# Patient Record
Sex: MALE | Race: WHITE | NOT HISPANIC OR LATINO | Employment: PART TIME | URBAN - METROPOLITAN AREA
[De-identification: names, ages, dates, MRNs, and addresses within clinical notes are randomized per-mention and may not be internally consistent; named-entity substitution may affect disease eponyms.]

---

## 2017-05-29 ENCOUNTER — HOSPITAL ENCOUNTER (EMERGENCY)
Facility: HOSPITAL | Age: 32
Discharge: HOME | End: 2017-05-30
Attending: EMERGENCY MEDICINE | Admitting: EMERGENCY MEDICINE
Payer: COMMERCIAL

## 2017-05-29 DIAGNOSIS — H66.90 OTITIS MEDIA: Primary | ICD-10-CM

## 2017-05-29 RX ORDER — OXYCODONE HYDROCHLORIDE AND ACETAMINOPHEN 5; 325 MG/1; MG/1
1 TABLET ORAL EVERY 6 HOURS PRN
Qty: 12 TABLET | Refills: 0 | Status: SHIPPED | OUTPATIENT
Start: 2017-05-29 | End: 2017-06-08

## 2017-05-29 RX ORDER — TIZANIDINE 4 MG/1
TABLET ORAL 2 TIMES DAILY
COMMUNITY

## 2017-05-29 RX ORDER — AZITHROMYCIN 250 MG/1
500 TABLET, FILM COATED ORAL EVERY 24 HOURS
Status: DISCONTINUED | OUTPATIENT
Start: 2017-05-30 | End: 2017-05-30 | Stop reason: HOSPADM

## 2017-05-29 RX ORDER — GABAPENTIN 600 MG/1
600 TABLET ORAL
Status: ON HOLD | COMMUNITY
End: 2022-03-06 | Stop reason: ALTCHOICE

## 2017-05-29 RX ORDER — GUAIFENESIN 600 MG
600 TABLET, EXTENDED RELEASE 12 HR ORAL 2 TIMES DAILY
Status: DISCONTINUED | OUTPATIENT
Start: 2017-05-30 | End: 2017-05-30

## 2017-05-29 RX ORDER — FLUTICASONE PROPIONATE 50 MCG
1 SPRAY, SUSPENSION (ML) NASAL DAILY
Status: DISCONTINUED | OUTPATIENT
Start: 2017-05-30 | End: 2017-05-29

## 2017-05-29 RX ORDER — AZITHROMYCIN 250 MG/1
250 TABLET, FILM COATED ORAL DAILY
Qty: 4 TABLET | Refills: 0 | Status: SHIPPED | OUTPATIENT
Start: 2017-05-29 | End: 2017-06-02

## 2017-05-29 RX ORDER — FLUTICASONE PROPIONATE 50 MCG
1 SPRAY, SUSPENSION (ML) NASAL ONCE
Status: COMPLETED | OUTPATIENT
Start: 2017-05-30 | End: 2017-05-30

## 2017-05-30 VITALS
SYSTOLIC BLOOD PRESSURE: 129 MMHG | WEIGHT: 205 LBS | TEMPERATURE: 97.9 F | RESPIRATION RATE: 18 BRPM | DIASTOLIC BLOOD PRESSURE: 71 MMHG | HEART RATE: 87 BPM | OXYGEN SATURATION: 96 %

## 2017-05-30 PROCEDURE — 99283 EMERGENCY DEPT VISIT LOW MDM: CPT

## 2017-05-30 RX ORDER — GUAIFENESIN 600 MG
600 TABLET, EXTENDED RELEASE 12 HR ORAL 2 TIMES DAILY
Status: DISCONTINUED | OUTPATIENT
Start: 2017-05-30 | End: 2017-05-30 | Stop reason: HOSPADM

## 2017-05-30 RX ADMIN — FLUTICASONE PROPIONATE 1 SPRAY: 50 SPRAY, METERED NASAL at 00:13

## 2017-05-30 RX ADMIN — AZITHROMYCIN 500 MG: 250 TABLET, FILM COATED ORAL at 00:13

## 2017-05-30 RX ADMIN — GUAIFENESIN 600 MG: 600 TABLET, EXTENDED RELEASE ORAL at 00:21

## 2017-09-06 ENCOUNTER — HOSPITAL ENCOUNTER (EMERGENCY)
Facility: HOSPITAL | Age: 32
Discharge: HOME | End: 2017-09-06
Attending: EMERGENCY MEDICINE
Payer: COMMERCIAL

## 2017-09-06 VITALS
RESPIRATION RATE: 18 BRPM | OXYGEN SATURATION: 100 % | DIASTOLIC BLOOD PRESSURE: 71 MMHG | SYSTOLIC BLOOD PRESSURE: 147 MMHG | TEMPERATURE: 98.8 F | WEIGHT: 214.95 LBS | HEART RATE: 82 BPM

## 2017-09-06 DIAGNOSIS — J06.9 UPPER RESPIRATORY INFECTION: Primary | ICD-10-CM

## 2017-09-06 PROCEDURE — 99283 EMERGENCY DEPT VISIT LOW MDM: CPT

## 2017-09-06 RX ORDER — IBUPROFEN 600 MG/1
600 TABLET ORAL ONCE
Status: COMPLETED | OUTPATIENT
Start: 2017-09-06 | End: 2017-09-06

## 2017-09-06 RX ORDER — IBUPROFEN 600 MG/1
600 TABLET ORAL EVERY 6 HOURS PRN
Qty: 30 TABLET | Refills: 0 | Status: SHIPPED | OUTPATIENT
Start: 2017-09-06 | End: 2022-03-07 | Stop reason: HOSPADM

## 2017-09-06 RX ORDER — AZITHROMYCIN 250 MG/1
250 TABLET, FILM COATED ORAL DAILY
Qty: 4 TABLET | Refills: 0 | Status: SHIPPED | OUTPATIENT
Start: 2017-09-06 | End: 2017-09-10

## 2017-09-06 RX ORDER — AZITHROMYCIN 250 MG/1
500 TABLET, FILM COATED ORAL ONCE
Status: COMPLETED | OUTPATIENT
Start: 2017-09-06 | End: 2017-09-06

## 2017-09-06 RX ORDER — FLUTICASONE PROPIONATE 50 MCG
1 SPRAY, SUSPENSION (ML) NASAL DAILY
Status: DISCONTINUED | OUTPATIENT
Start: 2017-09-06 | End: 2017-09-06 | Stop reason: HOSPADM

## 2017-09-06 RX ADMIN — AZITHROMYCIN 500 MG: 250 TABLET, FILM COATED ORAL at 01:19

## 2017-09-06 RX ADMIN — FLUTICASONE PROPIONATE 1 SPRAY: 50 SPRAY, METERED NASAL at 01:19

## 2017-09-06 RX ADMIN — IBUPROFEN 600 MG: 600 TABLET ORAL at 01:19

## 2017-11-21 ENCOUNTER — HOSPITAL ENCOUNTER (EMERGENCY)
Facility: HOSPITAL | Age: 32
Discharge: HOME | End: 2017-11-21
Attending: EMERGENCY MEDICINE
Payer: MEDICARE

## 2017-11-21 VITALS
RESPIRATION RATE: 18 BRPM | TEMPERATURE: 98.7 F | WEIGHT: 210 LBS | DIASTOLIC BLOOD PRESSURE: 101 MMHG | SYSTOLIC BLOOD PRESSURE: 149 MMHG | OXYGEN SATURATION: 96 % | HEART RATE: 96 BPM

## 2017-11-21 DIAGNOSIS — J40 BRONCHITIS: ICD-10-CM

## 2017-11-21 DIAGNOSIS — J06.9 UPPER RESPIRATORY INFECTION: Primary | ICD-10-CM

## 2017-11-21 PROCEDURE — 99283 EMERGENCY DEPT VISIT LOW MDM: CPT

## 2017-11-21 RX ORDER — AZITHROMYCIN 250 MG/1
500 TABLET, FILM COATED ORAL ONCE
Status: COMPLETED | OUTPATIENT
Start: 2017-11-21 | End: 2017-11-21

## 2017-11-21 RX ORDER — AZITHROMYCIN 250 MG/1
250 TABLET, FILM COATED ORAL DAILY
Qty: 6 TABLET | Refills: 0 | Status: SHIPPED | OUTPATIENT
Start: 2017-11-21 | End: 2017-11-26

## 2017-11-21 RX ORDER — ACETAMINOPHEN 325 MG/1
975 TABLET ORAL ONCE
Status: COMPLETED | OUTPATIENT
Start: 2017-11-21 | End: 2017-11-21

## 2017-11-21 RX ADMIN — ACETAMINOPHEN 975 MG: 325 TABLET ORAL at 02:16

## 2017-11-21 RX ADMIN — AZITHROMYCIN 500 MG: 250 TABLET, FILM COATED ORAL at 02:17

## 2017-11-21 NOTE — ED NOTES
Pt discharge instructions reviewed, Pt has no further questions at this time  Pt awake and alert, no signs of acute distress noted  Pt ambulated out of ED with a steady gait       Stevenson Badillo RN  11/21/17 8616

## 2017-11-21 NOTE — ED NOTES
Pt seen, assessed, and discharged by Dr Don Mortimer  No  Signs of acute distress noted, YASH Samano, RN  11/21/17 7799

## 2017-11-21 NOTE — ED PROVIDER NOTES
History  Chief Complaint   Patient presents with    URI     Patient c/o URI symptoms and sore throat x 4 days  Patient presents to the emergency department for evaluation of URI symptoms that have been worsening over the past 3 or 4 days  Patient has a nonproductive cough with congestion and shoots up into his head and feels like it is heading down into his chest now  Patient is a smoker but denies fever or chills  Swallowing difficulty  Denies sob  Denies chest pain  Denies rash  Prior to Admission Medications   Prescriptions Last Dose Informant Patient Reported? Taking? TiZANidine HCl (ZANAFLEX PO)   Yes No   Sig: Take by mouth 2 (two) times a day   gabapentin (NEURONTIN) 600 MG tablet   Yes No   Sig: Take 600 mg by mouth daily at bedtime   guaiFENesin (ROBITUSSIN) 100 MG/5ML oral liquid   No No   Sig: Take 5-10 mL by mouth every 4 (four) hours as needed for cough   ibuprofen (MOTRIN) 600 mg tablet   No No   Sig: Take 1 tablet by mouth every 6 (six) hours as needed for mild pain   tapentadol (NUCYNTA) 50 mg tablet   Yes No   Sig: Take 50 mg by mouth as needed (instant relief)   tapentadol (NUCYNTA) tablet   Yes No   Sig: Take 100 mg by mouth 2 (two) times a day      Facility-Administered Medications: None       History reviewed  No pertinent past medical history  Past Surgical History:   Procedure Laterality Date    NECK SURGERY         History reviewed  No pertinent family history  I have reviewed and agree with the history as documented  Social History   Substance Use Topics    Smoking status: Light Tobacco Smoker     Packs/day: 0 50     Types: Cigarettes    Smokeless tobacco: Not on file    Alcohol use Yes      Comment: occasional        Review of Systems   Constitutional: Negative  Negative for activity change, appetite change, chills, diaphoresis, fatigue and fever  HENT: Positive for congestion and ear pain   Negative for dental problem, drooling, ear discharge, rhinorrhea, sinus pain, sneezing and trouble swallowing  Eyes: Negative  Negative for photophobia and visual disturbance  Respiratory: Positive for cough  Negative for choking, chest tightness, shortness of breath, wheezing and stridor  Cardiovascular: Negative  Negative for chest pain, palpitations and leg swelling  Gastrointestinal: Negative  Negative for abdominal distention, abdominal pain, constipation, diarrhea, nausea and rectal pain  Endocrine: Negative  Genitourinary: Negative  Musculoskeletal: Negative  Negative for back pain, neck pain and neck stiffness  Skin: Negative  Negative for rash  Allergic/Immunologic: Negative  Neurological: Negative  Negative for dizziness, seizures, syncope, light-headedness and headaches  Hematological: Negative  Does not bruise/bleed easily  Psychiatric/Behavioral: Negative  Physical Exam  ED Triage Vitals   Temperature Pulse Respirations Blood Pressure SpO2   11/21/17 0150 11/21/17 0149 11/21/17 0149 11/21/17 0149 11/21/17 0149   98 7 °F (37 1 °C) 96 18 (!) 149/101 96 %      Temp Source Heart Rate Source Patient Position - Orthostatic VS BP Location FiO2 (%)   11/21/17 0149 11/21/17 0149 11/21/17 0149 11/21/17 0149 --   Oral Monitor Sitting Left arm       Pain Score       11/21/17 0149       6           Orthostatic Vital Signs  Vitals:    11/21/17 0149   BP: (!) 149/101   Pulse: 96   Patient Position - Orthostatic VS: Sitting       Physical Exam   Constitutional: He is oriented to person, place, and time  He appears well-developed and well-nourished  Nontoxic appearance  No respiratory distress  Patient looks comfortable sitting upright on the stretcher  HENT:   Head: Normocephalic and atraumatic  Right Ear: External ear normal    Left Ear: External ear normal    Mouth/Throat: Oropharynx is clear and moist    No trismus submandibular tenderness  Tonsils are without erythema or exudates  No uvular deviation     Eyes: Conjunctivae and EOM are normal  Pupils are equal, round, and reactive to light  Neck: Normal range of motion  Neck supple  Cardiovascular: Normal rate, regular rhythm, normal heart sounds and intact distal pulses  Pulmonary/Chest: Effort normal and breath sounds normal  No respiratory distress  He has no wheezes  He has no rales  He exhibits no tenderness  Abdominal: Soft  Bowel sounds are normal  He exhibits no distension and no mass  There is no tenderness  There is no rebound and no guarding  No hernia  Musculoskeletal: Normal range of motion  Neurological: He is alert and oriented to person, place, and time  He has normal reflexes  He displays normal reflexes  No cranial nerve deficit or sensory deficit  He exhibits normal muscle tone  Coordination normal    Skin: Skin is warm and dry  No rash noted  Psychiatric: He has a normal mood and affect  His behavior is normal  Judgment and thought content normal    Nursing note and vitals reviewed  ED Medications  Medications   azithromycin (ZITHROMAX) tablet 500 mg (not administered)   acetaminophen (TYLENOL) tablet 975 mg (not administered)       Diagnostic Studies  Results Reviewed     None                 No orders to display              Procedures  Procedures       Phone Contacts  ED Phone Contact    ED Course  ED Course as of Nov 21 0159   Tue Nov 21, 2017   0154 Patient is stable for discharge  Will treat as bronchitis bacterial etiology even though stated to the patient it is possible this is viral etiology  He does smoke  I discussed signs and symptoms that would require return the emergency department                                  MDM  CritCare Time    Disposition  Final diagnoses:   Upper respiratory infection   Bronchitis     Time reflects when diagnosis was documented in both MDM as applicable and the Disposition within this note     Time User Action Codes Description Comment    11/21/2017  1:55 AM Evonne Hines [J06 9] Upper respiratory infection     11/21/2017  1:55 AM Shelby Hines Add [J40] Bronchitis       ED Disposition     ED Disposition Condition Comment    Discharge  Marly Allen discharge to home/self care  Condition at discharge: Stable        Follow-up Information     Follow up With Specialties Details [facility] physician  Schedule an appointment as soon as possible for a visit As needed         Patient's Medications   Discharge Prescriptions    AZITHROMYCIN (ZITHROMAX) 250 MG TABLET    Take 1 tablet by mouth daily for 5 days Take first 2 tablets together, then 1 every day until finished  Start Date: 11/21/2017End Date: 11/26/2017       Order Dose: 250 mg       Quantity: 6 tablet    Refills: 0     No discharge procedures on file      ED Provider  Electronically Signed by           Jimbo Carmichael MD  11/21/17 0155

## 2017-11-21 NOTE — DISCHARGE INSTRUCTIONS
Acute Bronchitis   WHAT YOU NEED TO KNOW:   What is acute bronchitis? Acute bronchitis is swelling and irritation in the air passages of your lungs  This irritation may cause you to cough or have other breathing problems  Acute bronchitis often starts because of another illness, such as a cold or the flu  The illness spreads from your nose and throat to your windpipe and airways  Bronchitis is often called a chest cold  Acute bronchitis lasts about 3 to 6 weeks and is usually not a serious illness  What causes acute bronchitis? · Infection  caused by a virus, bacteria, or a fungus    · Polluted air  caused by chemical fumes, dust, smoke, allergens, or pollution  What increases my risk for acute bronchitis? · Age, usually older adults    · Smoking cigarettes or being around cigarette smoke    · Chronic lung diseases or chronic sinus infections    · Weakened immune system    · Gastroesophageal reflux disease    · Allergies and environmental changes  What are the signs and symptoms of acute bronchitis? · A cough with sputum that may be clear, yellow, or green    · Feeling more tired than usual, and body aches    · A fever and chills    · Wheezing when you breathe    · A tight chest or pain when you breathe or cough  How is acute bronchitis diagnosed? Your healthcare provider may diagnose bronchitis by your symptoms  If he is not sure, you may need the following:  · Blood tests  will be done to see if your symptoms are caused by an infection  · X-ray  pictures of your lungs and heart may show signs of infection, such as pneumonia  Chest x-rays may also show fluid around your heart and lungs  How is acute bronchitis treated? Your healthcare provider will treat any condition that has caused your acute bronchitis  He may also give you any of the following:  · Ibuprofen or acetaminophen  are medicines that help lower your fever  They are available without a doctor's order   Ask your healthcare provider which medicine is right for you  Ask how much to take and how often to take it  Follow directions  These medicines can cause stomach bleeding if not taken correctly  Ibuprofen can cause kidney damage  Do not take ibuprofen if you have kidney disease, an ulcer, or allergies to aspirin  Acetaminophen can cause liver damage  Do not take more than 4,000 milligrams in 24 hours  · Decongestants  help loosen mucus in your lungs and make it easier to cough up  This can help you breathe easier  · Cough suppressants  decrease your urge to cough  If your cough produces mucus, do not take a cough suppressant unless your healthcare provider tells you to  Your healthcare provider may suggest that you take a cough suppressant at night so you can rest     · Inhalers  may be given  Your healthcare provider may give you one or more inhalers to help you breathe easier and cough less  An inhaler gives your medicine to open your airways  Ask your healthcare provider to show you how to use your inhaler correctly  How can I care for myself when I have acute bronchitis? · Get more rest   Rest helps your body to heal  Slowly start to do more each day  Rest when you feel it is needed  · Avoid irritants in the air  Avoid chemicals, fumes, and dust  Wear a face mask if you must work around dust or fumes  Stay inside on days when air pollution levels are high  If you have allergies, stay inside when pollen counts are high  Do not use aerosol products, such as spray-on deodorant, bug spray, and hair spray  · Do not smoke or be around others who smoke  Nicotine and other chemicals in cigarettes and cigars damages the cilia that move mucus out of your lungs  Ask your healthcare provider for information if you currently smoke and need help to quit  E-cigarettes or smokeless tobacco still contain nicotine  Talk to your healthcare provider before you use these products  · Drink liquids as directed    Liquids help keep your air passages moist and help you cough up mucus  You may need to drink more liquids when you have acute bronchitis  Ask how much liquid to drink each day and which liquids are best for you  · Use a humidifier or vaporizer  Use a cool mist humidifier or a vaporizer to increase air moisture in your home  This may make it easier for you to breathe and help decrease your cough  How can I decrease my risk for acute bronchitis? · Get the vaccinations you need  Ask your healthcare provider if you should get vaccinated against the flu or pneumonia  · Prevent the spread of germs  You can decrease your risk of acute bronchitis and other illnesses by doing the following:     [facility] your hands often with soap and water  Carry germ-killing hand lotion or gel with you  You can use the lotion or gel to clean your hands when soap and water are not available  ¨ Do not touch your eyes, nose, or mouth unless you have washed your hands first     ¨ Always cover your mouth when you cough to prevent the spread of germs  It is best to cough into a tissue or your shirt sleeve instead of into your hand  Ask those around you cover their mouths when they cough  ¨ Try to avoid people who have a cold or the flu  If you are sick, stay away from others as much as possible  When should I seek immediate care? · You cough up blood  · Your lips or fingernails turn blue  · You feel like you are not getting enough air when you breathe  When should I contact my healthcare provider? · You have a fever  · Your breathing problems do not go away or get worse  · Your cough does not get better within 4 weeks  · You have questions or concerns about your condition or care  CARE AGREEMENT:   You have the right to help plan your care  Learn about your health condition and how it may be treated  Discuss treatment options with your caregivers to decide what care you want to receive  You always have the right to refuse treatment  The above information is an  only  It is not intended as medical advice for individual conditions or treatments  Talk to your doctor, nurse or pharmacist before following any medical regimen to see if it is safe and effective for you  © 2017 [address] [employer] [employer] Information is for End User's use only and may not be sold, redistributed or otherwise used for commercial purposes  All illustrations and images included in CareNotes® are the copyrighted property of [employer]  or Hany Alvarez  Upper Respiratory Infection   WHAT YOU NEED TO KNOW:   What is an upper respiratory infection? An upper respiratory infection is also called a common cold  It can affect your nose, throat, ears, and sinuses  What causes a cold? The common cold is caused by a virus  There are many different cold viruses, and each is contagious  This means the virus can be easily spread to another person when the sick person coughs or sneezes  The virus can also be spread if you touch something that a person with a cold has touched  You are more likely to get a cold in the winter  Your risk of getting a cold may be increased if you smoke cigarettes or have allergies, such as hay fever  What are the signs and symptoms of a cold? Cold symptoms are usually worst for the first 3 to 5 days  You may have any of the following:  · Runny or stuffy nose    · Sneezing and coughing    · Sore throat or hoarseness    · Red, watery, and sore eyes    · Fatigue     · Chills and fever    · Headache, body aches, or sore muscles  How is a cold treated? There is no cure for the common cold  Colds are caused by viruses and do not get better with antibiotics  Most people get better in 7 to 14 days  You may continue to cough for 2 to 3 weeks  The following may help decrease your symptoms:  · Decongestants  help reduce nasal congestion and help you breathe more easily   If you take decongestant pills, they may make you feel restless or cause problems with your sleep  Do not use decongestant sprays for more than a few days  · Cough suppressants  help reduce coughing  Ask your healthcare provider which type of cough medicine is best for you  · NSAIDs , such as ibuprofen, help decrease swelling, pain, and fever  NSAIDs can cause stomach bleeding or kidney problems in certain people  If you take blood thinner medicine, always ask your healthcare provider if NSAIDs are safe for you  Always read the medicine label and follow directions  · Acetaminophen  decreases pain and fever  It is available without a doctor's order  Ask how much to take and how often to take it  Follow directions  Read the labels of all other medicines you are using to see if they also contain acetaminophen, or ask your doctor or pharmacist  Acetaminophen can cause liver damage if not taken correctly  Do not use more than 4 grams (4,000 milligrams) total of acetaminophen in one day  How can I manage my cold? · Rest as much as possible  Slowly start to do more each day  · Drink more liquids as directed  Liquids will help thin and loosen mucus so you can cough it up  Liquids will also help prevent dehydration  Liquids that help prevent dehydration include water, fruit juice, and broth  Do not drink liquids that contain caffeine  Caffeine can increase your risk for dehydration  Ask your healthcare provider how much liquid to drink each day  · Soothe a sore throat  Gargle with warm salt water  This helps your sore throat feel better  Make salt water by dissolving ¼ teaspoon salt in 1 cup warm water  You may also suck on hard candy or throat lozenges  You may use a sore throat spray  · Use a humidifier or vaporizer  Use a cool mist humidifier or a vaporizer to increase air moisture in your home  This may make it easier for you to breathe and help decrease your cough  · Use saline nasal drops as directed  These help relieve congestion  · Apply petroleum-based jelly around the outside of your nostrils  This can decrease irritation from blowing your nose  · Do not smoke  Nicotine and other chemicals in cigarettes and cigars can make your symptoms worse  They can also cause infections such as bronchitis or pneumonia  Ask your healthcare provider for information if you currently smoke and need help to quit  E-cigarettes or smokeless tobacco still contain nicotine  Talk to your healthcare provider before you use these products  What can I do to prevent the spread of the common cold? · Try to stay away from other people during the first 2 to 3 days of your cold when it is more easily spread  · Do not share food or drinks  · Do not share hand towels with household members  · Wash your hands often, especially after you blow your nose  Turn away from other people and cover your mouth and nose with a tissue when you sneeze or cough  When should I seek immediate care? · You have chest pain or trouble breathing  When should I contact my healthcare provider? · You have a fever over 102ºF (39ºC)  · Your sore throat gets worse or you see white or yellow spots in your throat  · Your symptoms get worse after 3 to 5 days or your cold is not better in 14 days  · You have a rash anywhere on your skin  · You have large, tender lumps in your neck  · You have thick, green, or yellow drainage from your nose  · You cough up thick yellow, green, or bloody mucus  · You are vomiting for more than 24 hours and cannot keep fluids down  · You have a bad earache  · You have questions or concerns about your condition or care  CARE AGREEMENT:   You have the right to help plan your care  Learn about your health condition and how it may be treated  Discuss treatment options with your caregivers to decide what care you want to receive  You always have the right to refuse treatment   The above information is an  only  It is not intended as medical advice for individual conditions or treatments  Talk to your doctor, nurse or pharmacist before following any medical regimen to see if it is safe and effective for you  © 2017 [address] [employer] Information is for End User's use only and may not be sold, redistributed or otherwise used for commercial purposes  All illustrations and images included in CareNotes® are the copyrighted property of [employer]  or Hany Alvarez

## 2018-05-15 ENCOUNTER — APPOINTMENT (EMERGENCY)
Dept: RADIOLOGY | Facility: HOSPITAL | Age: 33
End: 2018-05-15
Payer: MEDICARE

## 2018-05-15 ENCOUNTER — HOSPITAL ENCOUNTER (EMERGENCY)
Facility: HOSPITAL | Age: 33
Discharge: HOME | End: 2018-05-15
Attending: EMERGENCY MEDICINE | Admitting: EMERGENCY MEDICINE
Payer: MEDICARE

## 2018-05-15 VITALS
WEIGHT: 205 LBS | TEMPERATURE: 100.1 F | DIASTOLIC BLOOD PRESSURE: 65 MMHG | HEART RATE: 82 BPM | OXYGEN SATURATION: 99 % | RESPIRATION RATE: 18 BRPM | SYSTOLIC BLOOD PRESSURE: 114 MMHG

## 2018-05-15 DIAGNOSIS — J40 BRONCHITIS: Primary | ICD-10-CM

## 2018-05-15 DIAGNOSIS — R07.9 CHEST PAIN: ICD-10-CM

## 2018-05-15 LAB
ALBUMIN SERPL BCP-MCNC: 3.7 G/DL (ref 3.5–5)
ALP SERPL-CCNC: 112 U/L (ref 46–116)
ALT SERPL W P-5'-P-CCNC: 73 U/L (ref 12–78)
ANION GAP SERPL CALCULATED.3IONS-SCNC: 12 MMOL/L (ref 4–13)
APTT PPP: 33 SECONDS (ref 23–35)
AST SERPL W P-5'-P-CCNC: 36 U/L (ref 5–45)
ATRIAL RATE: 79 BPM
BASOPHILS # BLD AUTO: 0.02 THOUSANDS/ΜL (ref 0–0.1)
BASOPHILS NFR BLD AUTO: 0 % (ref 0–1)
BILIRUB SERPL-MCNC: 0.4 MG/DL (ref 0.2–1)
BUN SERPL-MCNC: 9 MG/DL (ref 5–25)
CALCIUM SERPL-MCNC: 8.5 MG/DL (ref 8.3–10.1)
CHLORIDE SERPL-SCNC: 103 MMOL/L (ref 100–108)
CK SERPL-CCNC: 79 U/L (ref 39–308)
CO2 SERPL-SCNC: 23 MMOL/L (ref 21–32)
CREAT SERPL-MCNC: 0.98 MG/DL (ref 0.6–1.3)
DEPRECATED D DIMER PPP: <270 NG/ML (FEU) (ref 0–424)
EOSINOPHIL # BLD AUTO: 0.1 THOUSAND/ΜL (ref 0–0.61)
EOSINOPHIL NFR BLD AUTO: 1 % (ref 0–6)
ERYTHROCYTE [DISTWIDTH] IN BLOOD BY AUTOMATED COUNT: 12.6 % (ref 11.6–15.1)
GFR SERPL CREATININE-BSD FRML MDRD: 103 ML/MIN/1.73SQ M
GLUCOSE SERPL-MCNC: 104 MG/DL (ref 65–140)
HCT VFR BLD AUTO: 49.3 % (ref 36.5–49.3)
HGB BLD-MCNC: 16.5 G/DL (ref 12–17)
INR PPP: 0.92 (ref 0.86–1.16)
LYMPHOCYTES # BLD AUTO: 1.45 THOUSANDS/ΜL (ref 0.6–4.47)
LYMPHOCYTES NFR BLD AUTO: 14 % (ref 14–44)
MCH RBC QN AUTO: 28.1 PG (ref 26.8–34.3)
MCHC RBC AUTO-ENTMCNC: 33.5 G/DL (ref 31.4–37.4)
MCV RBC AUTO: 84 FL (ref 82–98)
MONOCYTES # BLD AUTO: 0.67 THOUSAND/ΜL (ref 0.17–1.22)
MONOCYTES NFR BLD AUTO: 7 % (ref 4–12)
NEUTROPHILS # BLD AUTO: 7.91 THOUSANDS/ΜL (ref 1.85–7.62)
NEUTS SEG NFR BLD AUTO: 78 % (ref 43–75)
P AXIS: 53 DEGREES
PLATELET # BLD AUTO: 189 THOUSANDS/UL (ref 149–390)
PMV BLD AUTO: 11.2 FL (ref 8.9–12.7)
POTASSIUM SERPL-SCNC: 3.8 MMOL/L (ref 3.5–5.3)
PR INTERVAL: 110 MS
PROT SERPL-MCNC: 7.5 G/DL (ref 6.4–8.2)
PROTHROMBIN TIME: 12.6 SECONDS (ref 12.1–14.4)
QRS AXIS: 47 DEGREES
QRSD INTERVAL: 90 MS
QT INTERVAL: 332 MS
QTC INTERVAL: 380 MS
RBC # BLD AUTO: 5.87 MILLION/UL (ref 3.88–5.62)
SODIUM SERPL-SCNC: 138 MMOL/L (ref 136–145)
T WAVE AXIS: 38 DEGREES
TROPONIN I SERPL-MCNC: <0.02 NG/ML
VENTRICULAR RATE: 79 BPM
WBC # BLD AUTO: 10.15 THOUSAND/UL (ref 4.31–10.16)

## 2018-05-15 PROCEDURE — 99285 EMERGENCY DEPT VISIT HI MDM: CPT

## 2018-05-15 PROCEDURE — 85730 THROMBOPLASTIN TIME PARTIAL: CPT | Performed by: EMERGENCY MEDICINE

## 2018-05-15 PROCEDURE — 82550 ASSAY OF CK (CPK): CPT | Performed by: EMERGENCY MEDICINE

## 2018-05-15 PROCEDURE — 96361 HYDRATE IV INFUSION ADD-ON: CPT

## 2018-05-15 PROCEDURE — 87040 BLOOD CULTURE FOR BACTERIA: CPT | Performed by: EMERGENCY MEDICINE

## 2018-05-15 PROCEDURE — 36415 COLL VENOUS BLD VENIPUNCTURE: CPT | Performed by: EMERGENCY MEDICINE

## 2018-05-15 PROCEDURE — 93005 ELECTROCARDIOGRAM TRACING: CPT

## 2018-05-15 PROCEDURE — 85025 COMPLETE CBC W/AUTO DIFF WBC: CPT | Performed by: EMERGENCY MEDICINE

## 2018-05-15 PROCEDURE — 93010 ELECTROCARDIOGRAM REPORT: CPT | Performed by: INTERNAL MEDICINE

## 2018-05-15 PROCEDURE — 94640 AIRWAY INHALATION TREATMENT: CPT

## 2018-05-15 PROCEDURE — 85379 FIBRIN DEGRADATION QUANT: CPT | Performed by: EMERGENCY MEDICINE

## 2018-05-15 PROCEDURE — 84484 ASSAY OF TROPONIN QUANT: CPT | Performed by: EMERGENCY MEDICINE

## 2018-05-15 PROCEDURE — 80053 COMPREHEN METABOLIC PANEL: CPT | Performed by: EMERGENCY MEDICINE

## 2018-05-15 PROCEDURE — 96374 THER/PROPH/DIAG INJ IV PUSH: CPT

## 2018-05-15 PROCEDURE — 71046 X-RAY EXAM CHEST 2 VIEWS: CPT

## 2018-05-15 PROCEDURE — 85610 PROTHROMBIN TIME: CPT | Performed by: EMERGENCY MEDICINE

## 2018-05-15 RX ORDER — BENZONATATE 200 MG/1
200 CAPSULE ORAL 3 TIMES DAILY PRN
Qty: 21 CAPSULE | Refills: 0 | Status: SHIPPED | OUTPATIENT
Start: 2018-05-15 | End: 2018-05-22

## 2018-05-15 RX ORDER — KETOROLAC TROMETHAMINE 30 MG/ML
30 INJECTION, SOLUTION INTRAMUSCULAR; INTRAVENOUS ONCE
Status: COMPLETED | OUTPATIENT
Start: 2018-05-15 | End: 2018-05-15

## 2018-05-15 RX ORDER — AZITHROMYCIN 250 MG/1
500 TABLET, FILM COATED ORAL ONCE
Status: COMPLETED | OUTPATIENT
Start: 2018-05-15 | End: 2018-05-15

## 2018-05-15 RX ORDER — ALBUTEROL SULFATE 2.5 MG/3ML
2.5 SOLUTION RESPIRATORY (INHALATION) ONCE
Status: COMPLETED | OUTPATIENT
Start: 2018-05-15 | End: 2018-05-15

## 2018-05-15 RX ORDER — ALBUTEROL SULFATE 90 UG/1
2 AEROSOL, METERED RESPIRATORY (INHALATION) ONCE
Status: COMPLETED | OUTPATIENT
Start: 2018-05-15 | End: 2018-05-15

## 2018-05-15 RX ORDER — AZITHROMYCIN 250 MG/1
250 TABLET, FILM COATED ORAL DAILY
Qty: 4 TABLET | Refills: 0 | Status: SHIPPED | OUTPATIENT
Start: 2018-05-16 | End: 2018-05-20

## 2018-05-15 RX ORDER — BENZONATATE 100 MG/1
200 CAPSULE ORAL ONCE
Status: COMPLETED | OUTPATIENT
Start: 2018-05-15 | End: 2018-05-15

## 2018-05-15 RX ADMIN — AZITHROMYCIN 500 MG: 250 TABLET, FILM COATED ORAL at 03:43

## 2018-05-15 RX ADMIN — BENZONATATE 200 MG: 100 CAPSULE ORAL at 03:43

## 2018-05-15 RX ADMIN — ALBUTEROL SULFATE 2.5 MG: 2.5 SOLUTION RESPIRATORY (INHALATION) at 02:35

## 2018-05-15 RX ADMIN — SODIUM CHLORIDE 1000 ML: 0.9 INJECTION, SOLUTION INTRAVENOUS at 02:05

## 2018-05-15 RX ADMIN — ALBUTEROL SULFATE 2 PUFF: 90 AEROSOL, METERED RESPIRATORY (INHALATION) at 03:45

## 2018-05-15 RX ADMIN — KETOROLAC TROMETHAMINE 30 MG: 30 INJECTION, SOLUTION INTRAMUSCULAR at 02:30

## 2018-05-15 NOTE — DISCHARGE INSTRUCTIONS

## 2018-05-15 NOTE — ED NOTES
Pt discharge instructions reviewed, Pt has no further questions at this time  Pt awake and alert, no signs of acute distress noted  Pt ambulated out of ED with a steady gait       Joyce Monday, RN  05/15/18 7381

## 2018-05-20 LAB
BACTERIA BLD CULT: NORMAL
BACTERIA BLD CULT: NORMAL

## 2019-04-15 ENCOUNTER — APPOINTMENT (EMERGENCY)
Dept: RADIOLOGY | Facility: HOSPITAL | Age: 34
End: 2019-04-15
Payer: COMMERCIAL

## 2019-04-15 ENCOUNTER — HOSPITAL ENCOUNTER (EMERGENCY)
Facility: HOSPITAL | Age: 34
Discharge: HOME | End: 2019-04-15
Attending: EMERGENCY MEDICINE | Admitting: EMERGENCY MEDICINE
Payer: COMMERCIAL

## 2019-04-15 VITALS
SYSTOLIC BLOOD PRESSURE: 124 MMHG | WEIGHT: 220 LBS | TEMPERATURE: 99 F | OXYGEN SATURATION: 97 % | HEART RATE: 87 BPM | DIASTOLIC BLOOD PRESSURE: 79 MMHG | RESPIRATION RATE: 20 BRPM

## 2019-04-15 DIAGNOSIS — J18.9 PNEUMONIA: Primary | ICD-10-CM

## 2019-04-15 PROCEDURE — 99283 EMERGENCY DEPT VISIT LOW MDM: CPT

## 2019-04-15 PROCEDURE — 94640 AIRWAY INHALATION TREATMENT: CPT

## 2019-04-15 PROCEDURE — 71046 X-RAY EXAM CHEST 2 VIEWS: CPT

## 2019-04-15 PROCEDURE — 99284 EMERGENCY DEPT VISIT MOD MDM: CPT | Performed by: PHYSICIAN ASSISTANT

## 2019-04-15 RX ORDER — PREDNISONE 20 MG/1
60 TABLET ORAL DAILY
Qty: 12 TABLET | Refills: 0 | Status: SHIPPED | OUTPATIENT
Start: 2019-04-15 | End: 2019-04-19

## 2019-04-15 RX ORDER — ALBUTEROL SULFATE 90 UG/1
2 AEROSOL, METERED RESPIRATORY (INHALATION) ONCE
Status: COMPLETED | OUTPATIENT
Start: 2019-04-15 | End: 2019-04-15

## 2019-04-15 RX ORDER — AZITHROMYCIN 250 MG/1
TABLET, FILM COATED ORAL
Qty: 6 TABLET | Refills: 0 | Status: SHIPPED | OUTPATIENT
Start: 2019-04-15 | End: 2019-04-19

## 2019-04-15 RX ORDER — PREDNISONE 20 MG/1
60 TABLET ORAL ONCE
Status: COMPLETED | OUTPATIENT
Start: 2019-04-15 | End: 2019-04-15

## 2019-04-15 RX ADMIN — ALBUTEROL SULFATE 2 PUFF: 90 AEROSOL, METERED RESPIRATORY (INHALATION) at 18:51

## 2019-04-15 RX ADMIN — PREDNISONE 60 MG: 20 TABLET ORAL at 18:51

## 2019-04-15 RX ADMIN — ALBUTEROL SULFATE 5 MG: 2.5 SOLUTION RESPIRATORY (INHALATION) at 18:52

## 2019-04-15 RX ADMIN — IPRATROPIUM BROMIDE 0.5 MG: 0.5 SOLUTION RESPIRATORY (INHALATION) at 18:52

## 2019-10-17 ENCOUNTER — HOSPITAL ENCOUNTER (EMERGENCY)
Facility: HOSPITAL | Age: 34
Discharge: HOME | End: 2019-10-17
Attending: EMERGENCY MEDICINE

## 2019-10-17 VITALS
RESPIRATION RATE: 18 BRPM | TEMPERATURE: 98.6 F | SYSTOLIC BLOOD PRESSURE: 151 MMHG | WEIGHT: 228.18 LBS | HEART RATE: 88 BPM | OXYGEN SATURATION: 98 % | DIASTOLIC BLOOD PRESSURE: 69 MMHG

## 2019-10-17 DIAGNOSIS — B34.9 VIRAL ILLNESS: Primary | ICD-10-CM

## 2019-10-17 PROCEDURE — 99282 EMERGENCY DEPT VISIT SF MDM: CPT | Performed by: EMERGENCY MEDICINE

## 2019-10-17 PROCEDURE — 99283 EMERGENCY DEPT VISIT LOW MDM: CPT

## 2019-10-17 NOTE — ED PROVIDER NOTES
History  Chief Complaint   Patient presents with    Cold Like Symptoms     cold symptoms started a few days prior, ear pain as well  OTC medications were not working     HPI     21-year-old male with history of asthma presenting for evaluation of dry nonproductive cough, sore throat, bilateral ear pain, and body aches that have been present for the last 2 days  No fevers or chills  Denies shortness of breath  No chest pain  He has been using his home inhalers more than usual   No abdominal pain, nausea, vomiting, or diarrhea  Patient has been taking Mucinex without relief  Prior to Admission Medications   Prescriptions Last Dose Informant Patient Reported? Taking?   gabapentin (NEURONTIN) 600 MG tablet   Yes No   Sig: Take 600 mg by mouth daily at bedtime   guaiFENesin (ROBITUSSIN) 100 MG/5ML oral liquid   No No   Sig: Take 5-10 mL by mouth every 4 (four) hours as needed for cough   ibuprofen (MOTRIN) 600 mg tablet   No No   Sig: Take 1 tablet by mouth every 6 (six) hours as needed for mild pain   tapentadol (NUCYNTA) 50 mg tablet   Yes No   Sig: Take 50 mg by mouth as needed (instant relief)   tapentadol (NUCYNTA) tablet   Yes No   Sig: Take 100 mg by mouth 2 (two) times a day   tiZANidine (ZANAFLEX) 4 mg tablet   Yes No   Sig: Take by mouth 2 (two) times a day      Facility-Administered Medications: None       Past Medical History:   Diagnosis Date    Asthma        Past Surgical History:   Procedure Laterality Date    FACIAL RECONSTRUCTION SURGERY      NECK SURGERY         No family history on file  I have reviewed and agree with the history as documented  Social History     Tobacco Use    Smoking status: Light Tobacco Smoker     Packs/day: 0 50     Types: Cigarettes    Smokeless tobacco: Never Used   Substance Use Topics    Alcohol use: Yes     Comment: occasional    Drug use: No        Review of Systems   Constitutional: Negative for chills and fever     HENT: Positive for ear pain (bilateral) and sore throat  Negative for congestion  Eyes: Negative for visual disturbance  Respiratory: Positive for cough  Negative for shortness of breath  Cardiovascular: Negative for chest pain and leg swelling  Gastrointestinal: Negative for abdominal pain, diarrhea, nausea and vomiting  Genitourinary: Negative for dysuria and frequency  Musculoskeletal: Positive for myalgias (diffuse)  Negative for arthralgias, back pain, neck pain and neck stiffness  Skin: Negative for rash  Neurological: Negative for weakness, numbness and headaches  Psychiatric/Behavioral: Negative for agitation, behavioral problems and confusion  Physical Exam  Physical Exam   Constitutional: He is oriented to person, place, and time  He appears well-developed and well-nourished  No distress  HENT:   Head: Normocephalic and atraumatic  Right Ear: External ear normal    Left Ear: External ear normal    Nose: Nose normal    Mouth/Throat: Oropharynx is clear and moist  No oropharyngeal exudate (tonsils normal in size)  Serous effusions behind both eardrums bilaterally, no erythema  No swelling, erythema, or tenderness to percussion of the bilateral mastoids  Eyes: Pupils are equal, round, and reactive to light  Conjunctivae and EOM are normal    Neck: Normal range of motion  Neck supple  No meningismus   Cardiovascular: Normal rate, regular rhythm, normal heart sounds and intact distal pulses  Exam reveals no gallop and no friction rub  No murmur heard  Pulmonary/Chest: Effort normal and breath sounds normal  No respiratory distress  He has no wheezes  He has no rales  Abdominal: Soft  Bowel sounds are normal  He exhibits no distension  There is no tenderness  There is no guarding  Musculoskeletal: Normal range of motion  He exhibits no edema or deformity  Neurological: He is alert and oriented to person, place, and time  He exhibits normal muscle tone  Skin: Skin is warm and dry  He is not diaphoretic  Vital Signs  ED Triage Vitals [10/17/19 1411]   Temperature Pulse Respirations Blood Pressure SpO2   98 6 °F (37 °C) 88 18 151/69 98 %      Temp Source Heart Rate Source Patient Position - Orthostatic VS BP Location FiO2 (%)   Oral Monitor Lying Right arm --      Pain Score       7           Vitals:    10/17/19 1411   BP: 151/69   Pulse: 88   Patient Position - Orthostatic VS: Lying         Visual Acuity      ED Medications  Medications - No data to display    Diagnostic Studies  Results Reviewed     None                 No orders to display              Procedures  Procedures       ED Course                               MDM  Number of Diagnoses or Management Options  Viral illness: new and does not require workup  Diagnosis management comments:   Nontoxic  Afebrile and hemodynamically stable  Eating and drinking normally  No evidence of acute bacterial illness such as acute otitis media, strep pharyngitis, pneumonia, or intra-abdominal process on exam   Neck supple, doubt meningitis  Constellation of symptoms consistent with viral illness  Recommend Mucinex, Flonase, and Sudafed as needed for relief, staying well hydrated  Work note provided as patient works in [employer]  Return precautions discussed for shortness of breath, vomiting with inability to tolerate oral intake, or new or concerning symptoms  Patient discharged in good condition  Patient Progress  Patient progress: stable         Disposition  Final diagnoses:   Viral illness     Time reflects when diagnosis was documented in both MDM as applicable and the Disposition within this note     Time User Action Codes Description Comment    10/17/2019  2:39 PM Jamia Velez Add [B34 9] Viral illness       ED Disposition     ED Disposition Condition Date/Time Comment    Discharge Stable u Oct 17, 2019  2:38 PM Diana Must discharge to home/self care              Follow-up Information     Follow up With Specialties Details Why Contact Info Additional Information    Theresa Rodriguez MD Internal Medicine In 3 days If symptoms persist [address] [address]  [phone]       [address] Emergency Department Emergency Medicine  As we discussed, return to the Emergency Department for difficulty breathing, vomiting with inability to tolerate oral intake, or new or concerning symptoms  [address] [facility] [address] [phone] AN ED, [address], [place], [place], [address]          Discharge Medication List as of 10/17/2019  2:40 PM      CONTINUE these medications which have NOT CHANGED    Details   gabapentin (NEURONTIN) 600 MG tablet Take 600 mg by mouth daily at bedtime, Until Discontinued, Historical Med      guaiFENesin (ROBITUSSIN) 100 MG/5ML oral liquid Take 5-10 mL by mouth every 4 (four) hours as needed for cough, Starting 5/29/2017, Until Discontinued, Print      ibuprofen (MOTRIN) 600 mg tablet Take 1 tablet by mouth every 6 (six) hours as needed for mild pain, Starting Wed 9/6/2017, Print      !! tapentadol (NUCYNTA) 50 mg tablet Take 50 mg by mouth as needed (instant relief), Until Discontinued, Historical Med      !! tapentadol (NUCYNTA) tablet Take 100 mg by mouth 2 (two) times a day, Until Discontinued, Historical Med      tiZANidine (ZANAFLEX) 4 mg tablet Take by mouth 2 (two) times a day, Historical Med       !! - Potential duplicate medications found  Please discuss with provider  No discharge procedures on file      ED Provider  Electronically Signed by           Eli Rodriguez MD  10/17/19 6399

## 2020-01-04 ENCOUNTER — HOSPITAL ENCOUNTER (EMERGENCY)
Facility: HOSPITAL | Age: 35
Discharge: HOME | End: 2020-01-04
Attending: EMERGENCY MEDICINE | Admitting: EMERGENCY MEDICINE

## 2020-01-04 VITALS
TEMPERATURE: 100.9 F | RESPIRATION RATE: 18 BRPM | HEART RATE: 96 BPM | DIASTOLIC BLOOD PRESSURE: 79 MMHG | WEIGHT: 236.99 LBS | OXYGEN SATURATION: 99 % | SYSTOLIC BLOOD PRESSURE: 143 MMHG

## 2020-01-04 DIAGNOSIS — J11.1 INFLUENZA: Primary | ICD-10-CM

## 2020-01-04 LAB
FLUAV RNA NPH QL NAA+PROBE: ABNORMAL
FLUBV RNA NPH QL NAA+PROBE: DETECTED
RSV RNA NPH QL NAA+PROBE: ABNORMAL

## 2020-01-04 PROCEDURE — 87631 RESP VIRUS 3-5 TARGETS: CPT | Performed by: EMERGENCY MEDICINE

## 2020-01-04 PROCEDURE — 99284 EMERGENCY DEPT VISIT MOD MDM: CPT | Performed by: EMERGENCY MEDICINE

## 2020-01-04 PROCEDURE — 99283 EMERGENCY DEPT VISIT LOW MDM: CPT

## 2020-01-04 RX ORDER — ACETAMINOPHEN 325 MG/1
650 TABLET ORAL ONCE
Status: COMPLETED | OUTPATIENT
Start: 2020-01-04 | End: 2020-01-04

## 2020-01-04 RX ORDER — IBUPROFEN 400 MG/1
400 TABLET ORAL ONCE
Status: COMPLETED | OUTPATIENT
Start: 2020-01-04 | End: 2020-01-04

## 2020-01-04 RX ORDER — OSELTAMIVIR PHOSPHATE 75 MG/1
75 CAPSULE ORAL ONCE
Status: COMPLETED | OUTPATIENT
Start: 2020-01-04 | End: 2020-01-04

## 2020-01-04 RX ORDER — OSELTAMIVIR PHOSPHATE 75 MG/1
75 CAPSULE ORAL 2 TIMES DAILY
Qty: 10 CAPSULE | Refills: 0 | Status: SHIPPED | OUTPATIENT
Start: 2020-01-04 | End: 2020-01-09

## 2020-01-04 RX ADMIN — IBUPROFEN 400 MG: 400 TABLET ORAL at 09:56

## 2020-01-04 RX ADMIN — OSELTAMIVIR PHOSPHATE 75 MG: 75 CAPSULE ORAL at 10:56

## 2020-01-04 RX ADMIN — ACETAMINOPHEN 650 MG: 325 TABLET, FILM COATED ORAL at 09:56

## 2020-01-04 NOTE — ED PROVIDER NOTES
History  Chief Complaint   Patient presents with    Sore Throat     Patient reports having headache, sore throat, and cough for approx 1 day  Concerned he may be getting bronchitis  History provided by:  Patient  Sore Throat   Location:  Generalized  Quality:  Aching  Severity:  Moderate  Onset quality:  Gradual  Duration:  1 day  Timing:  Constant  Progression:  Worsening  Chronicity:  New  Relieved by:  None tried  Worsened by:  Nothing  Ineffective treatments:  None tried  Associated symptoms: cough and rhinorrhea    Associated symptoms: no abdominal pain, no adenopathy, no chest pain, no chills, no epistaxis, no fever, no headaches, no neck stiffness, no night sweats, no rash, no shortness of breath and no stridor        Prior to Admission Medications   Prescriptions Last Dose Informant Patient Reported? Taking?   gabapentin (NEURONTIN) 600 MG tablet   Yes No   Sig: Take 600 mg by mouth daily at bedtime   guaiFENesin (ROBITUSSIN) 100 MG/5ML oral liquid Not Taking at Unknown time  No No   Sig: Take 5-10 mL by mouth every 4 (four) hours as needed for cough   Patient not taking: Reported on 1/4/2020   ibuprofen (MOTRIN) 600 mg tablet   No No   Sig: Take 1 tablet by mouth every 6 (six) hours as needed for mild pain   tapentadol (NUCYNTA) 50 mg tablet   Yes No   Sig: Take 50 mg by mouth as needed (instant relief)   tapentadol (NUCYNTA) tablet   Yes No   Sig: Take 100 mg by mouth 2 (two) times a day   tiZANidine (ZANAFLEX) 4 mg tablet   Yes No   Sig: Take by mouth 2 (two) times a day      Facility-Administered Medications: None       Past Medical History:   Diagnosis Date    Asthma        Past Surgical History:   Procedure Laterality Date    FACIAL RECONSTRUCTION SURGERY      NECK SURGERY         History reviewed  No pertinent family history  I have reviewed and agree with the history as documented      Social History     Tobacco Use    Smoking status: Light Tobacco Smoker     Packs/day: 0 50     Types: Cigarettes    Smokeless tobacco: Never Used   Substance Use Topics    Alcohol use: Yes     Comment: occasional    Drug use: No        Review of Systems   Constitutional: Negative for activity change, chills, diaphoresis, fever and night sweats  HENT: Positive for congestion, rhinorrhea and sore throat  Negative for nosebleeds and sinus pressure  Eyes: Negative for pain and visual disturbance  Respiratory: Positive for cough  Negative for chest tightness, shortness of breath, wheezing and stridor  Cardiovascular: Negative for chest pain and palpitations  Gastrointestinal: Negative for abdominal distention, abdominal pain, constipation, diarrhea, nausea and vomiting  Genitourinary: Negative for dysuria and frequency  Musculoskeletal: Positive for myalgias  Negative for neck pain and neck stiffness  Skin: Negative for rash  Neurological: Negative for dizziness, speech difficulty, light-headedness, numbness and headaches  Hematological: Negative for adenopathy  Physical Exam  Physical Exam   Constitutional: He is oriented to person, place, and time  He appears well-developed  No distress  HENT:   Head: Normocephalic and atraumatic  Right Ear: Tympanic membrane normal    Left Ear: Tympanic membrane normal    Mouth/Throat: Uvula is midline  No oral lesions  Posterior oropharyngeal erythema present  No posterior oropharyngeal edema or tonsillar abscesses  Eyes: Pupils are equal, round, and reactive to light  Neck: Normal range of motion  Neck supple  No tracheal deviation present  Cardiovascular: Normal rate, regular rhythm, normal heart sounds and intact distal pulses  No murmur heard  Pulmonary/Chest: Effort normal and breath sounds normal  No stridor  No respiratory distress  Abdominal: Soft  He exhibits no distension  There is no tenderness  There is no rebound and no guarding  Musculoskeletal: Normal range of motion     Neurological: He is alert and oriented to person, place, and time  Skin: Skin is warm and dry  He is not diaphoretic  No erythema  No pallor  Psychiatric: He has a normal mood and affect  Vitals reviewed  Vital Signs  ED Triage Vitals [01/04/20 0937]   Temperature Pulse Respirations Blood Pressure SpO2   (!) 100 9 °F (38 3 °C) 96 18 143/79 99 %      Temp Source Heart Rate Source Patient Position - Orthostatic VS BP Location FiO2 (%)   Oral Monitor -- Right arm --      Pain Score       2           Vitals:    01/04/20 0937   BP: 143/79   Pulse: 96         Visual Acuity      ED Medications  Medications   acetaminophen (TYLENOL) tablet 650 mg (650 mg Oral Given 1/4/20 0956)   ibuprofen (MOTRIN) tablet 400 mg (400 mg Oral Given 1/4/20 0956)   oseltamivir (TAMIFLU) capsule 75 mg (75 mg Oral Given 1/4/20 1056)       Diagnostic Studies  Results Reviewed     Procedure Component Value Units Date/Time    Influenza A/B and RSV PCR [[id number]]  (Abnormal) Collected:  01/04/20 0951    Lab Status:  Final result Specimen:  Nose Updated:  01/04/20 1032     INFLUENZA A PCR None Detected     INFLUENZA B PCR Detected     RSV PCR None Detected                 No orders to display              Procedures  Procedures         ED Course                               MDM  Number of Diagnoses or Management Options  Influenza: new and requires workup  Diagnosis management comments:       Initial ED assessment:  65-year-old male, worse in , fever, body aches, sore throat    Initial DDx includes but is not limited to:   High suspicion for clinical influenza  Initial ED plan:   Patient requesting confirmatory testing  , would typically just treat with Tamiflu , will test for influenza        Final ED summary/disposition:   After evaluation and workup in the emergency department, positiv for influenza will treat with Tamiflu       Amount and/or Complexity of Data Reviewed  Clinical lab tests: ordered and reviewed  Review and summarize past medical records: yes          Disposition  Final diagnoses:   Influenza     Time reflects when diagnosis was documented in both MDM as applicable and the Disposition within this note     Time User Action Codes Description Comment    1/4/2020 10:50 AM Ronda Delaney Add [J11 1] Influenza       ED Disposition     ED Disposition Condition Date/Time Comment    Discharge Stable Sat Jan 4, 2020 10:50 AM Álvaro Bridges discharge to home/self care  Follow-up Information    None         Discharge Medication List as of 1/4/2020 10:51 AM      START taking these medications    Details   oseltamivir (TAMIFLU) 75 mg capsule Take 1 capsule (75 mg total) by mouth 2 (two) times a day for 5 days, Starting Sat 1/4/2020, Until Thu 1/9/2020, Print         CONTINUE these medications which have NOT CHANGED    Details   gabapentin (NEURONTIN) 600 MG tablet Take 600 mg by mouth daily at bedtime, Until Discontinued, Historical Med      guaiFENesin (ROBITUSSIN) 100 MG/5ML oral liquid Take 5-10 mL by mouth every 4 (four) hours as needed for cough, Starting 5/29/2017, Until Discontinued, Print      ibuprofen (MOTRIN) 600 mg tablet Take 1 tablet by mouth every 6 (six) hours as needed for mild pain, Starting Wed 9/6/2017, Print      !! tapentadol (NUCYNTA) 50 mg tablet Take 50 mg by mouth as needed (instant relief), Until Discontinued, Historical Med      !! tapentadol (NUCYNTA) tablet Take 100 mg by mouth 2 (two) times a day, Until Discontinued, Historical Med      tiZANidine (ZANAFLEX) 4 mg tablet Take by mouth 2 (two) times a day, Historical Med       !! - Potential duplicate medications found  Please discuss with provider  No discharge procedures on file      ED Provider  Electronically Signed by           Derrick Eden DO  01/04/20 0965

## 2020-11-23 ENCOUNTER — HOSPITAL ENCOUNTER (EMERGENCY)
Facility: HOSPITAL | Age: 35
Discharge: HOME | End: 2020-11-23
Attending: EMERGENCY MEDICINE

## 2020-11-23 VITALS
SYSTOLIC BLOOD PRESSURE: 136 MMHG | WEIGHT: 215 LBS | TEMPERATURE: 98.3 F | HEART RATE: 107 BPM | OXYGEN SATURATION: 96 % | DIASTOLIC BLOOD PRESSURE: 80 MMHG | RESPIRATION RATE: 16 BRPM | HEIGHT: 67 IN | BODY MASS INDEX: 33.74 KG/M2

## 2020-11-23 DIAGNOSIS — K04.7 DENTAL ABSCESS: Primary | ICD-10-CM

## 2020-11-23 DIAGNOSIS — K08.89 TOOTHACHE: ICD-10-CM

## 2020-11-23 PROCEDURE — 99282 EMERGENCY DEPT VISIT SF MDM: CPT

## 2020-11-23 PROCEDURE — 99284 EMERGENCY DEPT VISIT MOD MDM: CPT | Performed by: EMERGENCY MEDICINE

## 2020-11-23 RX ORDER — CLINDAMYCIN HYDROCHLORIDE 150 MG/1
300 CAPSULE ORAL 4 TIMES DAILY
Qty: 56 CAPSULE | Refills: 0 | Status: SHIPPED | OUTPATIENT
Start: 2020-11-23 | End: 2020-11-30

## 2020-11-23 RX ORDER — CLINDAMYCIN HYDROCHLORIDE 150 MG/1
300 CAPSULE ORAL ONCE
Status: COMPLETED | OUTPATIENT
Start: 2020-11-23 | End: 2020-11-23

## 2020-11-23 RX ADMIN — CLINDAMYCIN HYDROCHLORIDE 300 MG: 150 CAPSULE ORAL at 22:47

## 2020-11-26 ENCOUNTER — APPOINTMENT (EMERGENCY)
Dept: CT IMAGING | Facility: HOSPITAL | Age: 35
End: 2020-11-26

## 2020-11-26 ENCOUNTER — HOSPITAL ENCOUNTER (EMERGENCY)
Facility: HOSPITAL | Age: 35
Discharge: HOME | End: 2020-11-26
Attending: EMERGENCY MEDICINE | Admitting: EMERGENCY MEDICINE

## 2020-11-26 VITALS
HEART RATE: 76 BPM | SYSTOLIC BLOOD PRESSURE: 132 MMHG | TEMPERATURE: 97.9 F | DIASTOLIC BLOOD PRESSURE: 78 MMHG | OXYGEN SATURATION: 98 % | RESPIRATION RATE: 18 BRPM

## 2020-11-26 DIAGNOSIS — K04.7 DENTAL ABSCESS: Primary | ICD-10-CM

## 2020-11-26 LAB
ANION GAP SERPL CALCULATED.3IONS-SCNC: 11 MMOL/L (ref 4–13)
BASOPHILS # BLD AUTO: 0.05 THOUSANDS/ΜL (ref 0–0.1)
BASOPHILS NFR BLD AUTO: 1 % (ref 0–1)
BUN SERPL-MCNC: 6 MG/DL (ref 5–25)
CALCIUM SERPL-MCNC: 8.7 MG/DL (ref 8.3–10.1)
CHLORIDE SERPL-SCNC: 104 MMOL/L (ref 100–108)
CO2 SERPL-SCNC: 24 MMOL/L (ref 21–32)
CREAT SERPL-MCNC: 0.98 MG/DL (ref 0.6–1.3)
EOSINOPHIL # BLD AUTO: 0.2 THOUSAND/ΜL (ref 0–0.61)
EOSINOPHIL NFR BLD AUTO: 2 % (ref 0–6)
ERYTHROCYTE [DISTWIDTH] IN BLOOD BY AUTOMATED COUNT: 12.5 % (ref 11.6–15.1)
GFR SERPL CREATININE-BSD FRML MDRD: 101 ML/MIN/1.73SQ M
GLUCOSE SERPL-MCNC: 117 MG/DL (ref 65–140)
HCT VFR BLD AUTO: 49 % (ref 36.5–49.3)
HGB BLD-MCNC: 15.7 G/DL (ref 12–17)
IMM GRANULOCYTES # BLD AUTO: 0.04 THOUSAND/UL (ref 0–0.2)
IMM GRANULOCYTES NFR BLD AUTO: 0 % (ref 0–2)
LACTATE SERPL-SCNC: 0.7 MMOL/L (ref 0.5–2)
LYMPHOCYTES # BLD AUTO: 2.09 THOUSANDS/ΜL (ref 0.6–4.47)
LYMPHOCYTES NFR BLD AUTO: 20 % (ref 14–44)
MCH RBC QN AUTO: 28.3 PG (ref 26.8–34.3)
MCHC RBC AUTO-ENTMCNC: 32 G/DL (ref 31.4–37.4)
MCV RBC AUTO: 88 FL (ref 82–98)
MONOCYTES # BLD AUTO: 0.75 THOUSAND/ΜL (ref 0.17–1.22)
MONOCYTES NFR BLD AUTO: 7 % (ref 4–12)
NEUTROPHILS # BLD AUTO: 7.39 THOUSANDS/ΜL (ref 1.85–7.62)
NEUTS SEG NFR BLD AUTO: 70 % (ref 43–75)
NRBC BLD AUTO-RTO: 0 /100 WBCS
PLATELET # BLD AUTO: 273 THOUSANDS/UL (ref 149–390)
PMV BLD AUTO: 10.7 FL (ref 8.9–12.7)
POTASSIUM SERPL-SCNC: 3.9 MMOL/L (ref 3.5–5.3)
RBC # BLD AUTO: 5.55 MILLION/UL (ref 3.88–5.62)
SODIUM SERPL-SCNC: 139 MMOL/L (ref 136–145)
WBC # BLD AUTO: 10.52 THOUSAND/UL (ref 4.31–10.16)

## 2020-11-26 PROCEDURE — 36415 COLL VENOUS BLD VENIPUNCTURE: CPT | Performed by: EMERGENCY MEDICINE

## 2020-11-26 PROCEDURE — 96375 TX/PRO/DX INJ NEW DRUG ADDON: CPT

## 2020-11-26 PROCEDURE — 70491 CT SOFT TISSUE NECK W/DYE: CPT

## 2020-11-26 PROCEDURE — 96365 THER/PROPH/DIAG IV INF INIT: CPT

## 2020-11-26 PROCEDURE — 96376 TX/PRO/DX INJ SAME DRUG ADON: CPT

## 2020-11-26 PROCEDURE — 99284 EMERGENCY DEPT VISIT MOD MDM: CPT | Performed by: EMERGENCY MEDICINE

## 2020-11-26 PROCEDURE — 96361 HYDRATE IV INFUSION ADD-ON: CPT

## 2020-11-26 PROCEDURE — 85025 COMPLETE CBC W/AUTO DIFF WBC: CPT | Performed by: EMERGENCY MEDICINE

## 2020-11-26 PROCEDURE — 80048 BASIC METABOLIC PNL TOTAL CA: CPT | Performed by: EMERGENCY MEDICINE

## 2020-11-26 PROCEDURE — G1004 CDSM NDSC: HCPCS

## 2020-11-26 PROCEDURE — 99284 EMERGENCY DEPT VISIT MOD MDM: CPT

## 2020-11-26 PROCEDURE — 83605 ASSAY OF LACTIC ACID: CPT | Performed by: EMERGENCY MEDICINE

## 2020-11-26 RX ORDER — AMOXICILLIN 500 MG/1
500 CAPSULE ORAL EVERY 12 HOURS SCHEDULED
Qty: 14 CAPSULE | Refills: 0 | Status: SHIPPED | OUTPATIENT
Start: 2020-11-26 | End: 2020-11-26 | Stop reason: SDUPTHER

## 2020-11-26 RX ORDER — HYDROCODONE BITARTRATE AND ACETAMINOPHEN 5; 325 MG/1; MG/1
1 TABLET ORAL EVERY 6 HOURS PRN
Qty: 12 TABLET | Refills: 0 | Status: SHIPPED | OUTPATIENT
Start: 2020-11-26 | End: 2020-11-26 | Stop reason: SDUPTHER

## 2020-11-26 RX ORDER — KETOROLAC TROMETHAMINE 30 MG/ML
15 INJECTION, SOLUTION INTRAMUSCULAR; INTRAVENOUS ONCE
Status: COMPLETED | OUTPATIENT
Start: 2020-11-26 | End: 2020-11-26

## 2020-11-26 RX ORDER — AMOXICILLIN 500 MG/1
500 CAPSULE ORAL EVERY 12 HOURS SCHEDULED
Qty: 14 CAPSULE | Refills: 0 | Status: SHIPPED | OUTPATIENT
Start: 2020-11-26 | End: 2020-12-03

## 2020-11-26 RX ORDER — HYDROCODONE BITARTRATE AND ACETAMINOPHEN 5; 325 MG/1; MG/1
1 TABLET ORAL EVERY 6 HOURS PRN
Qty: 12 TABLET | Refills: 0 | Status: SHIPPED | OUTPATIENT
Start: 2020-11-26 | End: 2020-12-06

## 2020-11-26 RX ADMIN — IOHEXOL 100 ML: 350 INJECTION, SOLUTION INTRAVENOUS at 10:01

## 2020-11-26 RX ADMIN — AMPICILLIN SODIUM AND SULBACTAM SODIUM 3 G: 2; 1 INJECTION, POWDER, FOR SOLUTION INTRAMUSCULAR; INTRAVENOUS at 10:07

## 2020-11-26 RX ADMIN — KETOROLAC TROMETHAMINE 15 MG: 30 INJECTION, SOLUTION INTRAMUSCULAR at 11:24

## 2020-11-26 RX ADMIN — KETOROLAC TROMETHAMINE 15 MG: 30 INJECTION, SOLUTION INTRAMUSCULAR at 07:53

## 2020-11-26 RX ADMIN — SODIUM CHLORIDE 1000 ML: 0.9 INJECTION, SOLUTION INTRAVENOUS at 07:52

## 2020-11-30 ENCOUNTER — TELEPHONE (OUTPATIENT)
Dept: GASTROENTEROLOGY | Facility: AMBULARY SURGERY CENTER | Age: 35
End: 2020-11-30

## 2020-11-30 ENCOUNTER — HOSPITAL ENCOUNTER (EMERGENCY)
Facility: HOSPITAL | Age: 35
Discharge: HOME | End: 2020-11-30
Attending: EMERGENCY MEDICINE | Admitting: EMERGENCY MEDICINE

## 2020-11-30 ENCOUNTER — APPOINTMENT (EMERGENCY)
Dept: CT IMAGING | Facility: HOSPITAL | Age: 35
End: 2020-11-30

## 2020-11-30 VITALS
RESPIRATION RATE: 18 BRPM | TEMPERATURE: 97.5 F | DIASTOLIC BLOOD PRESSURE: 60 MMHG | OXYGEN SATURATION: 99 % | SYSTOLIC BLOOD PRESSURE: 105 MMHG | WEIGHT: 220 LBS | HEART RATE: 60 BPM | BODY MASS INDEX: 34.46 KG/M2

## 2020-11-30 DIAGNOSIS — K92.0 HEMATEMESIS: Primary | ICD-10-CM

## 2020-11-30 LAB
ALBUMIN SERPL BCP-MCNC: 3 G/DL (ref 3.5–5)
ALP SERPL-CCNC: 71 U/L (ref 46–116)
ALT SERPL W P-5'-P-CCNC: 48 U/L (ref 12–78)
ANION GAP SERPL CALCULATED.3IONS-SCNC: 8 MMOL/L (ref 4–13)
APTT PPP: 25 SECONDS (ref 23–37)
AST SERPL W P-5'-P-CCNC: 27 U/L (ref 5–45)
BASOPHILS # BLD AUTO: 0.04 THOUSANDS/ΜL (ref 0–0.1)
BASOPHILS NFR BLD AUTO: 0 % (ref 0–1)
BILIRUB SERPL-MCNC: 0.27 MG/DL (ref 0.2–1)
BUN SERPL-MCNC: 28 MG/DL (ref 5–25)
CALCIUM ALBUM COR SERPL-MCNC: 9 MG/DL (ref 8.3–10.1)
CALCIUM SERPL-MCNC: 8.2 MG/DL (ref 8.3–10.1)
CHLORIDE SERPL-SCNC: 106 MMOL/L (ref 100–108)
CO2 SERPL-SCNC: 25 MMOL/L (ref 21–32)
CREAT SERPL-MCNC: 1.03 MG/DL (ref 0.6–1.3)
EOSINOPHIL # BLD AUTO: 0.14 THOUSAND/ΜL (ref 0–0.61)
EOSINOPHIL NFR BLD AUTO: 1 % (ref 0–6)
ERYTHROCYTE [DISTWIDTH] IN BLOOD BY AUTOMATED COUNT: 12.3 % (ref 11.6–15.1)
EXT FECAL OCCULT BLOOD SCREEN: POSITIVE
EXT. CONTROL ED NAV: ABNORMAL
GFR SERPL CREATININE-BSD FRML MDRD: 95 ML/MIN/1.73SQ M
GLUCOSE SERPL-MCNC: 103 MG/DL (ref 65–140)
HCT VFR BLD AUTO: 34.8 % (ref 36.5–49.3)
HGB BLD-MCNC: 11.2 G/DL (ref 12–17)
IMM GRANULOCYTES # BLD AUTO: 0.02 THOUSAND/UL (ref 0–0.2)
IMM GRANULOCYTES NFR BLD AUTO: 0 % (ref 0–2)
INR PPP: 1.15 (ref 0.84–1.19)
LIPASE SERPL-CCNC: 79 U/L (ref 73–393)
LYMPHOCYTES # BLD AUTO: 2.15 THOUSANDS/ΜL (ref 0.6–4.47)
LYMPHOCYTES NFR BLD AUTO: 20 % (ref 14–44)
MCH RBC QN AUTO: 28.6 PG (ref 26.8–34.3)
MCHC RBC AUTO-ENTMCNC: 32.2 G/DL (ref 31.4–37.4)
MCV RBC AUTO: 89 FL (ref 82–98)
MONOCYTES # BLD AUTO: 0.5 THOUSAND/ΜL (ref 0.17–1.22)
MONOCYTES NFR BLD AUTO: 5 % (ref 4–12)
NEUTROPHILS # BLD AUTO: 7.68 THOUSANDS/ΜL (ref 1.85–7.62)
NEUTS SEG NFR BLD AUTO: 74 % (ref 43–75)
NRBC BLD AUTO-RTO: 0 /100 WBCS
PLATELET # BLD AUTO: 260 THOUSANDS/UL (ref 149–390)
PMV BLD AUTO: 10.8 FL (ref 8.9–12.7)
POTASSIUM SERPL-SCNC: 4.2 MMOL/L (ref 3.5–5.3)
PROT SERPL-MCNC: 6.1 G/DL (ref 6.4–8.2)
PROTHROMBIN TIME: 14.8 SECONDS (ref 11.6–14.5)
RBC # BLD AUTO: 3.92 MILLION/UL (ref 3.88–5.62)
SODIUM SERPL-SCNC: 139 MMOL/L (ref 136–145)
WBC # BLD AUTO: 10.53 THOUSAND/UL (ref 4.31–10.16)

## 2020-11-30 PROCEDURE — 82270 OCCULT BLOOD FECES: CPT | Performed by: PHYSICIAN ASSISTANT

## 2020-11-30 PROCEDURE — C9113 INJ PANTOPRAZOLE SODIUM, VIA: HCPCS | Performed by: PHYSICIAN ASSISTANT

## 2020-11-30 PROCEDURE — 74177 CT ABD & PELVIS W/CONTRAST: CPT

## 2020-11-30 PROCEDURE — 85025 COMPLETE CBC W/AUTO DIFF WBC: CPT | Performed by: PHYSICIAN ASSISTANT

## 2020-11-30 PROCEDURE — 96375 TX/PRO/DX INJ NEW DRUG ADDON: CPT

## 2020-11-30 PROCEDURE — 83690 ASSAY OF LIPASE: CPT | Performed by: PHYSICIAN ASSISTANT

## 2020-11-30 PROCEDURE — 99284 EMERGENCY DEPT VISIT MOD MDM: CPT | Performed by: PHYSICIAN ASSISTANT

## 2020-11-30 PROCEDURE — 36415 COLL VENOUS BLD VENIPUNCTURE: CPT | Performed by: PHYSICIAN ASSISTANT

## 2020-11-30 PROCEDURE — 96365 THER/PROPH/DIAG IV INF INIT: CPT

## 2020-11-30 PROCEDURE — 85610 PROTHROMBIN TIME: CPT | Performed by: PHYSICIAN ASSISTANT

## 2020-11-30 PROCEDURE — 85730 THROMBOPLASTIN TIME PARTIAL: CPT | Performed by: PHYSICIAN ASSISTANT

## 2020-11-30 PROCEDURE — 80053 COMPREHEN METABOLIC PANEL: CPT | Performed by: PHYSICIAN ASSISTANT

## 2020-11-30 PROCEDURE — 99285 EMERGENCY DEPT VISIT HI MDM: CPT

## 2020-11-30 PROCEDURE — 96366 THER/PROPH/DIAG IV INF ADDON: CPT

## 2020-11-30 RX ORDER — SUCRALFATE 1 G/1
1 TABLET ORAL 3 TIMES DAILY
Qty: 30 TABLET | Refills: 0 | Status: ON HOLD | OUTPATIENT
Start: 2020-11-30 | End: 2022-03-07

## 2020-11-30 RX ORDER — ONDANSETRON 2 MG/ML
4 INJECTION INTRAMUSCULAR; INTRAVENOUS ONCE
Status: COMPLETED | OUTPATIENT
Start: 2020-11-30 | End: 2020-11-30

## 2020-11-30 RX ORDER — PANTOPRAZOLE SODIUM 20 MG/1
40 TABLET, DELAYED RELEASE ORAL DAILY
Qty: 20 TABLET | Refills: 0 | Status: SHIPPED | OUTPATIENT
Start: 2020-11-30 | End: 2020-12-01 | Stop reason: SDUPTHER

## 2020-11-30 RX ORDER — SUCRALFATE 1 G/1
1 TABLET ORAL ONCE
Status: COMPLETED | OUTPATIENT
Start: 2020-11-30 | End: 2020-11-30

## 2020-11-30 RX ADMIN — ONDANSETRON 4 MG: 2 INJECTION INTRAMUSCULAR; INTRAVENOUS at 02:52

## 2020-11-30 RX ADMIN — SODIUM CHLORIDE 80 MG: 9 INJECTION, SOLUTION INTRAVENOUS at 03:07

## 2020-11-30 RX ADMIN — IOHEXOL 100 ML: 350 INJECTION, SOLUTION INTRAVENOUS at 03:49

## 2020-11-30 RX ADMIN — SODIUM CHLORIDE 1000 ML: 0.9 INJECTION, SOLUTION INTRAVENOUS at 02:55

## 2020-11-30 RX ADMIN — SUCRALFATE 1 G: 1 TABLET ORAL at 04:52

## 2020-12-01 ENCOUNTER — OFFICE VISIT (OUTPATIENT)
Dept: GASTROENTEROLOGY | Facility: AMBULARY SURGERY CENTER | Age: 35
End: 2020-12-01

## 2020-12-01 VITALS — RESPIRATION RATE: 18 BRPM | HEIGHT: 67 IN | BODY MASS INDEX: 34.21 KG/M2 | WEIGHT: 218 LBS

## 2020-12-01 DIAGNOSIS — K92.0 HEMATEMESIS: ICD-10-CM

## 2020-12-01 DIAGNOSIS — K92.1 HEMATOCHEZIA: Primary | ICD-10-CM

## 2020-12-01 DIAGNOSIS — K92.0 HEMATEMESIS WITHOUT NAUSEA: ICD-10-CM

## 2020-12-01 PROCEDURE — 99244 OFF/OP CNSLTJ NEW/EST MOD 40: CPT | Performed by: INTERNAL MEDICINE

## 2020-12-01 RX ORDER — PANTOPRAZOLE SODIUM 20 MG/1
40 TABLET, DELAYED RELEASE ORAL 2 TIMES DAILY
Qty: 60 TABLET | Refills: 3 | Status: ON HOLD | OUTPATIENT
Start: 2020-12-01 | End: 2022-03-07 | Stop reason: SDUPTHER

## 2020-12-01 RX ORDER — ALBUTEROL SULFATE 90 UG/1
1 AEROSOL, METERED RESPIRATORY (INHALATION) EVERY 6 HOURS PRN
COMMUNITY

## 2020-12-02 ENCOUNTER — TRANSCRIBE ORDERS (OUTPATIENT)
Dept: LAB | Facility: CLINIC | Age: 35
End: 2020-12-02

## 2020-12-02 ENCOUNTER — APPOINTMENT (OUTPATIENT)
Dept: LAB | Facility: CLINIC | Age: 35
End: 2020-12-02

## 2020-12-02 ENCOUNTER — TELEPHONE (OUTPATIENT)
Dept: GASTROENTEROLOGY | Facility: CLINIC | Age: 35
End: 2020-12-02

## 2020-12-02 DIAGNOSIS — K92.0 HEMATEMESIS WITHOUT NAUSEA: ICD-10-CM

## 2020-12-02 DIAGNOSIS — K92.1 HEMATOCHEZIA: Primary | ICD-10-CM

## 2020-12-02 DIAGNOSIS — K92.1 HEMATOCHEZIA: ICD-10-CM

## 2020-12-02 LAB
BUN SERPL-MCNC: 8 MG/DL (ref 5–25)
CREAT SERPL-MCNC: 0.94 MG/DL (ref 0.6–1.3)
GFR SERPL CREATININE-BSD FRML MDRD: 106 ML/MIN/1.73SQ M
HCT VFR BLD AUTO: 34.9 % (ref 36.5–49.3)
HGB BLD-MCNC: 11.2 G/DL (ref 12–17)

## 2020-12-02 PROCEDURE — 84520 ASSAY OF UREA NITROGEN: CPT

## 2020-12-02 PROCEDURE — 36415 COLL VENOUS BLD VENIPUNCTURE: CPT

## 2020-12-02 PROCEDURE — 85014 HEMATOCRIT: CPT

## 2020-12-02 PROCEDURE — 85018 HEMOGLOBIN: CPT

## 2020-12-02 PROCEDURE — 82565 ASSAY OF CREATININE: CPT

## 2020-12-16 ENCOUNTER — TELEPHONE (OUTPATIENT)
Dept: GASTROENTEROLOGY | Facility: AMBULARY SURGERY CENTER | Age: 35
End: 2020-12-16

## 2021-09-14 ENCOUNTER — HOSPITAL ENCOUNTER (EMERGENCY)
Facility: HOSPITAL | Age: 36
Discharge: HOME | End: 2021-09-14
Attending: EMERGENCY MEDICINE

## 2021-09-14 VITALS
HEIGHT: 67 IN | DIASTOLIC BLOOD PRESSURE: 83 MMHG | OXYGEN SATURATION: 98 % | TEMPERATURE: 98.1 F | HEART RATE: 95 BPM | WEIGHT: 176.15 LBS | BODY MASS INDEX: 27.65 KG/M2 | RESPIRATION RATE: 16 BRPM | SYSTOLIC BLOOD PRESSURE: 131 MMHG

## 2021-09-14 DIAGNOSIS — K04.7 DENTAL ABSCESS: Primary | ICD-10-CM

## 2021-09-14 DIAGNOSIS — R68.84 JAW PAIN: ICD-10-CM

## 2021-09-14 PROCEDURE — 99283 EMERGENCY DEPT VISIT LOW MDM: CPT

## 2021-09-14 PROCEDURE — 99284 EMERGENCY DEPT VISIT MOD MDM: CPT | Performed by: EMERGENCY MEDICINE

## 2021-09-14 RX ORDER — CLINDAMYCIN HYDROCHLORIDE 150 MG/1
450 CAPSULE ORAL 3 TIMES DAILY
Qty: 63 CAPSULE | Refills: 0 | Status: SHIPPED | OUTPATIENT
Start: 2021-09-14 | End: 2021-09-21

## 2021-09-14 RX ORDER — CLINDAMYCIN HYDROCHLORIDE 150 MG/1
450 CAPSULE ORAL ONCE
Status: COMPLETED | OUTPATIENT
Start: 2021-09-14 | End: 2021-09-14

## 2021-09-14 RX ORDER — NAPROXEN 500 MG/1
500 TABLET ORAL 2 TIMES DAILY WITH MEALS
Qty: 14 TABLET | Refills: 0 | Status: SHIPPED | OUTPATIENT
Start: 2021-09-14 | End: 2022-03-07 | Stop reason: HOSPADM

## 2021-09-14 RX ORDER — NAPROXEN 250 MG/1
500 TABLET ORAL ONCE
Status: COMPLETED | OUTPATIENT
Start: 2021-09-14 | End: 2021-09-14

## 2021-09-14 RX ADMIN — CLINDAMYCIN HYDROCHLORIDE 450 MG: 150 CAPSULE ORAL at 23:42

## 2021-09-14 RX ADMIN — NAPROXEN 500 MG: 250 TABLET ORAL at 23:42

## 2021-09-15 NOTE — DISCHARGE INSTRUCTIONS
Take clindamycin 450 mg orally 3 times daily over the next week to treat infection  You may continue taking acetaminophen as directed on over-the-counter packaging along with naproxen 500 mg twice daily (with food) to help discomfort  Contact a dental provider tomorrow to arrange close follow-up visit  You will require extraction of the affected teeth  Information for the [facility] is listed above  If you let the staff know you were seen in the emergency department they will arrange for close follow-up

## 2021-09-15 NOTE — ED PROVIDER NOTES
History  Chief Complaint   Patient presents with    Jaw Pain     Patient reports jaw pain and swelling starting yesterday  Miladis dental pain and injury but admits to broken tooth on same side  62-year-old male presents to the emergency department for evaluation gesturing to the right mandible relating it swelled up this morning out of nowhere    He relates that he had minimal discomfort yesterday    He did apply warm compress which provided him with some relief although pain intensified when at work later in the day  He does note slight discomfort in the right ear  He has not appreciated any change in hearing drainage from this  He has not appreciated any abnormal taste in mouth nor difficulty opening his mouth or swallowing  No chest pain or shortness of breath  He does admit to dental fracture in the right mandible  He did require extraction of a tooth in the left mandible last year and did have some leftover clindamycin which he took twice today  He has used acetaminophen as well which has provided minimal pain relief  He does not have a current dental provider though was considering seeking 1 at [facility]   He does express concern for not currently having dental insurance  Denies history of other ongoing medical conditions  He does admit to having used extremely high doses of ibuprofen at 1 point last year and having GI bleed as a result  He reports having had rash from Augmentin although being able to tolerate all other penicillin medications  Prior to Admission Medications   Prescriptions Last Dose Informant Patient Reported? Taking?    albuterol (PROVENTIL HFA,VENTOLIN HFA) 90 mcg/act inhaler   Yes No   Sig: Inhale 1 puff every 6 (six) hours as needed   gabapentin (NEURONTIN) 600 MG tablet   Yes No   Sig: Take 600 mg by mouth daily at bedtime   guaiFENesin (ROBITUSSIN) 100 MG/5ML oral liquid   No No   Sig: Take 5-10 mL by mouth every 4 (four) hours as needed for cough   Patient not taking: Reported on 1/4/2020   ibuprofen (MOTRIN) 600 mg tablet   No No   Sig: Take 1 tablet by mouth every 6 (six) hours as needed for mild pain   pantoprazole (PROTONIX) 20 mg tablet   No No   Sig: Take 2 tablets (40 mg total) by mouth 2 (two) times a day   sucralfate (CARAFATE) 1 g tablet   No No   Sig: Take 1 tablet (1 g total) by mouth 3 (three) times a day for 10 days   tapentadol (NUCYNTA) 50 mg tablet   Yes No   Sig: Take 50 mg by mouth as needed (instant relief)   tapentadol (NUCYNTA) tablet   Yes No   Sig: Take 100 mg by mouth 2 (two) times a day   tiZANidine (ZANAFLEX) 4 mg tablet   Yes No   Sig: Take by mouth 2 (two) times a day      Facility-Administered Medications: None       Past Medical History:   Diagnosis Date    Asthma        Past Surgical History:   Procedure Laterality Date    FACIAL RECONSTRUCTION SURGERY      NECK SURGERY         History reviewed  No pertinent family history  I have reviewed and agree with the history as documented  E-Cigarette/Vaping    E-Cigarette Use Never User      E-Cigarette/Vaping Substances     Social History     Tobacco Use    Smoking status: Light Tobacco Smoker     Packs/day: 0 50     Types: Cigarettes    Smokeless tobacco: Never Used   Vaping Use    Vaping Use: Never used   Substance Use Topics    Alcohol use: Yes     Comment: occasional    Drug use: No       Review of Systems   Eyes: Negative for visual disturbance  All other systems reviewed and are negative  Physical Exam  Physical Exam  Vitals and nursing note reviewed  Constitutional:       Appearance: Normal appearance  HENT:      Head: Normocephalic  Right Ear: Tympanic membrane, ear canal and external ear normal       Nose: Nose normal       Mouth/Throat:      Mouth: Mucous membranes are moist       Comments: Patient with extensive decay of the 2 posteriormost R mandibular molars  Slight adjacent gingival tenderness  No fluctuance intraorally or drainage appreciated  Sublingual region is nontender and without swelling  Right submental region is exquisitely tender  Eyes:      Extraocular Movements: Extraocular movements intact  Conjunctiva/sclera: Conjunctivae normal    Cardiovascular:      Rate and Rhythm: Normal rate  Pulmonary:      Effort: Pulmonary effort is normal  No respiratory distress  Musculoskeletal:      Cervical back: Normal range of motion and neck supple  Tenderness present  No rigidity  Lymphadenopathy:      Cervical: Cervical adenopathy (Right anterior cervical lymphadenopathy) present  Neurological:      Mental Status: He is alert  Vital Signs  ED Triage Vitals [09/14/21 2300]   Temperature Pulse Respirations Blood Pressure SpO2   98 1 °F (36 7 °C) 95 16 131/83 98 %      Temp Source Heart Rate Source Patient Position - Orthostatic VS BP Location FiO2 (%)   Oral Monitor Sitting Left arm --      Pain Score       6           Vitals:    09/14/21 2300   BP: 131/83   Pulse: 95   Patient Position - Orthostatic VS: Sitting         Visual Acuity      ED Medications  Medications   clindamycin (CLEOCIN) capsule 450 mg (450 mg Oral Given 9/14/21 2342)   naproxen (NAPROSYN) tablet 500 mg (500 mg Oral Given 9/14/21 2342)       Diagnostic Studies  Results Reviewed     None                 No orders to display              Procedures  Procedures         ED Course                                           MDM    Disposition  Final diagnoses:   Dental abscess   Jaw pain     Time reflects when diagnosis was documented in both MDM as applicable and the Disposition within this note     Time User Action Codes Description Comment    9/14/2021 11:37 PM Martinmas Deis A Add [K04 7] Dental abscess     9/14/2021 11:37 PM Martinmas Deis A Add [R68 84] Jaw pain       ED Disposition     ED Disposition Condition Date/Time Comment    Discharge Stable Tue Sep 14, 2021 11:37 PM Linda Monroy discharge to home/self care              Follow-up Information     Follow up With Specialties Details Why [address] [facility] [place]    [address] [facility] [address]    [address] [facility] [place] [address] [facility]          Discharge Medication List as of 9/14/2021 11:42 PM      START taking these medications    Details   clindamycin (CLEOCIN) 150 mg capsule Take 3 capsules (450 mg total) by mouth 3 (three) times a day for 7 days, Starting Tue 9/14/2021, Until Tue 9/21/2021, Normal      naproxen (NAPROSYN) 500 mg tablet Take 1 tablet (500 mg total) by mouth 2 (two) times a day with meals, Starting Tue 9/14/2021, Normal         CONTINUE these medications which have NOT CHANGED    Details   albuterol (PROVENTIL HFA,VENTOLIN HFA) 90 mcg/act inhaler Inhale 1 puff every 6 (six) hours as needed, Historical Med      gabapentin (NEURONTIN) 600 MG tablet Take 600 mg by mouth daily at bedtime, Until Discontinued, Historical Med      guaiFENesin (ROBITUSSIN) 100 MG/5ML oral liquid Take 5-10 mL by mouth every 4 (four) hours as needed for cough, Starting 5/29/2017, Until Discontinued, Print      ibuprofen (MOTRIN) 600 mg tablet Take 1 tablet by mouth every 6 (six) hours as needed for mild pain, Starting Wed 9/6/2017, Print      pantoprazole (PROTONIX) 20 mg tablet Take 2 tablets (40 mg total) by mouth 2 (two) times a day, Starting Tue 12/1/2020, Print      sucralfate (CARAFATE) 1 g tablet Take 1 tablet (1 g total) by mouth 3 (three) times a day for 10 days, Starting Mon 11/30/2020, Until Thu 12/10/2020, Normal      !! tapentadol (NUCYNTA) 50 mg tablet Take 50 mg by mouth as needed (instant relief), Until Discontinued, Historical Med      !! tapentadol (NUCYNTA) tablet Take 100 mg by mouth 2 (two) times a day, Until Discontinued, Historical Med      tiZANidine (ZANAFLEX) 4 mg tablet Take by mouth 2 (two) times a day, Historical Med       !! - Potential duplicate medications found  Please discuss with provider  No discharge procedures on file      PDMP Review       Value Time User    PDMP Reviewed  Yes 11/30/2020  2:18 AM Ubaldo Lao MD          ED Provider  Electronically Signed by           Lucien Lebron MD  09/15/21 7129

## 2022-03-05 ENCOUNTER — APPOINTMENT (EMERGENCY)
Dept: RADIOLOGY | Facility: HOSPITAL | Age: 37
DRG: 379 | End: 2022-03-05

## 2022-03-05 ENCOUNTER — HOSPITAL ENCOUNTER (INPATIENT)
Facility: HOSPITAL | Age: 37
LOS: 2 days | Discharge: HOME | DRG: 379 | End: 2022-03-07
Attending: EMERGENCY MEDICINE | Admitting: HOSPITALIST

## 2022-03-05 DIAGNOSIS — K92.2 GI BLEED: Primary | ICD-10-CM

## 2022-03-05 DIAGNOSIS — K92.1 MELENA: ICD-10-CM

## 2022-03-05 DIAGNOSIS — K92.0 HEMATEMESIS: ICD-10-CM

## 2022-03-05 DIAGNOSIS — K92.0 HEMATEMESIS WITH NAUSEA: ICD-10-CM

## 2022-03-05 DIAGNOSIS — K92.0 HEMATEMESIS WITHOUT NAUSEA: ICD-10-CM

## 2022-03-05 PROBLEM — J45.909 ASTHMA: Status: ACTIVE | Noted: 2022-03-05

## 2022-03-05 LAB
ABO GROUP BLD: NORMAL
ABO GROUP BLD: NORMAL
ALBUMIN SERPL BCP-MCNC: 3.1 G/DL (ref 3.5–5)
ALP SERPL-CCNC: 57 U/L (ref 46–116)
ALT SERPL W P-5'-P-CCNC: 24 U/L (ref 12–78)
ANION GAP SERPL CALCULATED.3IONS-SCNC: 3 MMOL/L (ref 4–13)
AST SERPL W P-5'-P-CCNC: 17 U/L (ref 5–45)
BASOPHILS # BLD AUTO: 0.04 THOUSANDS/ΜL (ref 0–0.1)
BASOPHILS NFR BLD AUTO: 1 % (ref 0–1)
BILIRUB SERPL-MCNC: 0.87 MG/DL (ref 0.2–1)
BLD GP AB SCN SERPL QL: NEGATIVE
BUN SERPL-MCNC: 26 MG/DL (ref 5–25)
CALCIUM ALBUM COR SERPL-MCNC: 8.5 MG/DL (ref 8.3–10.1)
CALCIUM SERPL-MCNC: 7.8 MG/DL (ref 8.3–10.1)
CHLORIDE SERPL-SCNC: 112 MMOL/L (ref 100–108)
CO2 SERPL-SCNC: 27 MMOL/L (ref 21–32)
CREAT SERPL-MCNC: 0.84 MG/DL (ref 0.6–1.3)
EOSINOPHIL # BLD AUTO: 0.1 THOUSAND/ΜL (ref 0–0.61)
EOSINOPHIL NFR BLD AUTO: 1 % (ref 0–6)
ERYTHROCYTE [DISTWIDTH] IN BLOOD BY AUTOMATED COUNT: 12.4 % (ref 11.6–15.1)
GFR SERPL CREATININE-BSD FRML MDRD: 114 ML/MIN/1.73SQ M
GLUCOSE SERPL-MCNC: 98 MG/DL (ref 65–140)
HCT VFR BLD AUTO: 36.9 % (ref 36.5–49.3)
HCT VFR BLD AUTO: 37.2 % (ref 36.5–49.3)
HGB BLD-MCNC: 12.5 G/DL (ref 12–17)
HGB BLD-MCNC: 12.6 G/DL (ref 12–17)
IMM GRANULOCYTES # BLD AUTO: 0.02 THOUSAND/UL (ref 0–0.2)
IMM GRANULOCYTES NFR BLD AUTO: 0 % (ref 0–2)
LYMPHOCYTES # BLD AUTO: 1.5 THOUSANDS/ΜL (ref 0.6–4.47)
LYMPHOCYTES NFR BLD AUTO: 21 % (ref 14–44)
MCH RBC QN AUTO: 28.8 PG (ref 26.8–34.3)
MCHC RBC AUTO-ENTMCNC: 33.9 G/DL (ref 31.4–37.4)
MCV RBC AUTO: 85 FL (ref 82–98)
MONOCYTES # BLD AUTO: 0.5 THOUSAND/ΜL (ref 0.17–1.22)
MONOCYTES NFR BLD AUTO: 7 % (ref 4–12)
NEUTROPHILS # BLD AUTO: 5.1 THOUSANDS/ΜL (ref 1.85–7.62)
NEUTS SEG NFR BLD AUTO: 70 % (ref 43–75)
NRBC BLD AUTO-RTO: 0 /100 WBCS
PLATELET # BLD AUTO: 206 THOUSANDS/UL (ref 149–390)
PMV BLD AUTO: 10.5 FL (ref 8.9–12.7)
POTASSIUM SERPL-SCNC: 4.1 MMOL/L (ref 3.5–5.3)
PROT SERPL-MCNC: 6 G/DL (ref 6.4–8.2)
RBC # BLD AUTO: 4.38 MILLION/UL (ref 3.88–5.62)
RH BLD: POSITIVE
RH BLD: POSITIVE
SODIUM SERPL-SCNC: 142 MMOL/L (ref 136–145)
SPECIMEN EXPIRATION DATE: NORMAL
WBC # BLD AUTO: 7.26 THOUSAND/UL (ref 4.31–10.16)

## 2022-03-05 PROCEDURE — 86900 BLOOD TYPING SEROLOGIC ABO: CPT | Performed by: SURGERY

## 2022-03-05 PROCEDURE — 86901 BLOOD TYPING SEROLOGIC RH(D): CPT | Performed by: SURGERY

## 2022-03-05 PROCEDURE — 99223 1ST HOSP IP/OBS HIGH 75: CPT | Performed by: INTERNAL MEDICINE

## 2022-03-05 PROCEDURE — 96374 THER/PROPH/DIAG INJ IV PUSH: CPT

## 2022-03-05 PROCEDURE — C9113 INJ PANTOPRAZOLE SODIUM, VIA: HCPCS

## 2022-03-05 PROCEDURE — 74178 CT ABD&PLV WO CNTR FLWD CNTR: CPT

## 2022-03-05 PROCEDURE — 85014 HEMATOCRIT: CPT | Performed by: INTERNAL MEDICINE

## 2022-03-05 PROCEDURE — G1004 CDSM NDSC: HCPCS

## 2022-03-05 PROCEDURE — 85025 COMPLETE CBC W/AUTO DIFF WBC: CPT | Performed by: EMERGENCY MEDICINE

## 2022-03-05 PROCEDURE — 99285 EMERGENCY DEPT VISIT HI MDM: CPT

## 2022-03-05 PROCEDURE — 36415 COLL VENOUS BLD VENIPUNCTURE: CPT

## 2022-03-05 PROCEDURE — C9113 INJ PANTOPRAZOLE SODIUM, VIA: HCPCS | Performed by: INTERNAL MEDICINE

## 2022-03-05 PROCEDURE — 86850 RBC ANTIBODY SCREEN: CPT | Performed by: SURGERY

## 2022-03-05 PROCEDURE — 96375 TX/PRO/DX INJ NEW DRUG ADDON: CPT

## 2022-03-05 PROCEDURE — 80053 COMPREHEN METABOLIC PANEL: CPT | Performed by: EMERGENCY MEDICINE

## 2022-03-05 PROCEDURE — 99285 EMERGENCY DEPT VISIT HI MDM: CPT | Performed by: EMERGENCY MEDICINE

## 2022-03-05 PROCEDURE — 85018 HEMOGLOBIN: CPT | Performed by: INTERNAL MEDICINE

## 2022-03-05 RX ORDER — TIZANIDINE 4 MG/1
4 TABLET ORAL 2 TIMES DAILY
Status: DISCONTINUED | OUTPATIENT
Start: 2022-03-05 | End: 2022-03-07 | Stop reason: HOSPADM

## 2022-03-05 RX ORDER — PANTOPRAZOLE SODIUM 40 MG/1
40 INJECTION, POWDER, FOR SOLUTION INTRAVENOUS EVERY 12 HOURS SCHEDULED
Status: DISCONTINUED | OUTPATIENT
Start: 2022-03-05 | End: 2022-03-07 | Stop reason: HOSPADM

## 2022-03-05 RX ORDER — ACETAMINOPHEN 325 MG/1
650 TABLET ORAL EVERY 6 HOURS PRN
Status: DISCONTINUED | OUTPATIENT
Start: 2022-03-05 | End: 2022-03-07 | Stop reason: HOSPADM

## 2022-03-05 RX ORDER — SUCRALFATE 1 G/1
1 TABLET ORAL EVERY 6 HOURS SCHEDULED
Status: DISCONTINUED | OUTPATIENT
Start: 2022-03-05 | End: 2022-03-07 | Stop reason: HOSPADM

## 2022-03-05 RX ORDER — ALBUTEROL SULFATE 90 UG/1
1 AEROSOL, METERED RESPIRATORY (INHALATION) EVERY 6 HOURS PRN
Status: DISCONTINUED | OUTPATIENT
Start: 2022-03-05 | End: 2022-03-07 | Stop reason: HOSPADM

## 2022-03-05 RX ORDER — SODIUM CHLORIDE 9 MG/ML
125 INJECTION, SOLUTION INTRAVENOUS CONTINUOUS
Status: DISCONTINUED | OUTPATIENT
Start: 2022-03-05 | End: 2022-03-06

## 2022-03-05 RX ORDER — GABAPENTIN 300 MG/1
600 CAPSULE ORAL
Status: DISCONTINUED | OUTPATIENT
Start: 2022-03-05 | End: 2022-03-07 | Stop reason: HOSPADM

## 2022-03-05 RX ORDER — ONDANSETRON 2 MG/ML
4 INJECTION INTRAMUSCULAR; INTRAVENOUS EVERY 4 HOURS PRN
Status: DISCONTINUED | OUTPATIENT
Start: 2022-03-05 | End: 2022-03-07 | Stop reason: HOSPADM

## 2022-03-05 RX ADMIN — SODIUM CHLORIDE 125 ML/HR: 9 INJECTION, SOLUTION INTRAVENOUS at 21:02

## 2022-03-05 RX ADMIN — SODIUM CHLORIDE 80 MG: 9 INJECTION, SOLUTION INTRAVENOUS at 09:41

## 2022-03-05 RX ADMIN — SODIUM CHLORIDE 125 ML/HR: 9 INJECTION, SOLUTION INTRAVENOUS at 13:31

## 2022-03-05 RX ADMIN — SUCRALFATE 1 G: 1 TABLET ORAL at 13:39

## 2022-03-05 RX ADMIN — SUCRALFATE 1 G: 1 TABLET ORAL at 17:09

## 2022-03-05 RX ADMIN — TIZANIDINE 4 MG: 4 TABLET ORAL at 13:39

## 2022-03-05 RX ADMIN — FAMOTIDINE 20 MG: 10 INJECTION INTRAVENOUS at 11:41

## 2022-03-05 RX ADMIN — IOHEXOL 100 ML: 350 INJECTION, SOLUTION INTRAVENOUS at 11:18

## 2022-03-05 RX ADMIN — PANTOPRAZOLE SODIUM 40 MG: 40 INJECTION, POWDER, FOR SOLUTION INTRAVENOUS at 21:03

## 2022-03-05 RX ADMIN — SUCRALFATE 1 G: 1 TABLET ORAL at 23:54

## 2022-03-05 NOTE — ASSESSMENT & PLAN NOTE
In the setting of progression of peptic ulcer disease and prior h/o extensive NSAID use (although he states he has not used NSAIDs for a while now) - similar episode late last year   Hemoglobin on presentation of 12 6 (although may be falsely elevated in the setting of hemoconcentration from vomiting) - continue serial H/H monitoring  CT imaging report noting: "No evidence of acute active GI bleeding  Acute proximal duodenitis likely peptic ulcer disease with adjacent fat stranding and reactive adenitis  Small ill-defined hypoattenuation of the anterolateral wall of the proximal duodenum suspicious for early ulceration  No free gas or bowel obstruction  GI consultation is advised  Nonenhancing high density fluid throughout the colon without evidence of active GI bleeding may represent hematochezia and/or recent ingestion of high density meal or medication intermixed with diarrhea   Correlate with H&H "  Continue IV PPI BID and Carafate - clear liquid diet w/ NPO after midnight for tentative scoping  IV fluids on board - monitor vitals and maintain hemodynamics  PRN emesis control  Not on chronic anticoagulation or anti-platelet agents

## 2022-03-05 NOTE — ED PROVIDER NOTES
History  Chief Complaint   Patient presents with    Vomiting Blood     Patient woke up around  and went to the bathroom  Was vomiting blood and having diarrhea with blood in stool  Was found "passed out" on couch by roomate  Denies any fevers/chills/N/D/CP/SOB at this time   Diarrhea     31 y o M w/ no pertinent PMH p/w bloody vomitus and melenic diarrhea  He notes he had a similar episode last year with the same presentation, was placed on a PPI, saw GI and then was cleared from their standpoint and stopped taking the PPI  He did not receive an endoscopy  He has felt generally well since then and has been making lifestyle changes such as stopped drinking, stopped taking NSAIDs, lost weight, and improved his diet  This past week he had no symptoms, but was working more than normal  He ate pizza last night at 9pm and then this morning at around 7am he became acutely nauseous and had the episode of maroon colored vomit  Then he had a dark/black episode of diarrhea  Since this time he has had very mild LLQ abdominal discomfort, but otherwise feels well  He denies any intentional overdose or SI  He denies taking any medications prior or after the episode  He denies fevers, chills, nausea, chest pain, rectal pain, loss of consciousness, headache, or other subjective symptoms  Prior to Admission Medications   Prescriptions Last Dose Informant Patient Reported? Taking?    albuterol (PROVENTIL HFA,VENTOLIN HFA) 90 mcg/act inhaler   Yes No   Sig: Inhale 1 puff every 6 (six) hours as needed   guaiFENesin (ROBITUSSIN) 100 MG/5ML oral liquid   No No   Sig: Take 5-10 mL by mouth every 4 (four) hours as needed for cough   Patient not taking: Reported on 1/4/2020   ibuprofen (MOTRIN) 600 mg tablet   No No   Sig: Take 1 tablet by mouth every 6 (six) hours as needed for mild pain   naproxen (NAPROSYN) 500 mg tablet   No No   Sig: Take 1 tablet (500 mg total) by mouth 2 (two) times a day with meals   pantoprazole (PROTONIX) 20 mg tablet   No No   Sig: Take 2 tablets (40 mg total) by mouth 2 (two) times a day   sucralfate (CARAFATE) 1 g tablet   No No   Sig: Take 1 tablet (1 g total) by mouth 3 (three) times a day for 10 days   tapentadol (NUCYNTA) 50 mg tablet   Yes No   Sig: Take 50 mg by mouth as needed (instant relief)   tapentadol (NUCYNTA) tablet   Yes No   Sig: Take 100 mg by mouth 2 (two) times a day   tiZANidine (ZANAFLEX) 4 mg tablet   Yes No   Sig: Take by mouth 2 (two) times a day      Facility-Administered Medications: None       Past Medical History:   Diagnosis Date    Asthma        Past Surgical History:   Procedure Laterality Date    FACIAL RECONSTRUCTION SURGERY      KNEE SURGERY      NECK SURGERY         History reviewed  No pertinent family history  I have reviewed and agree with the history as documented  E-Cigarette/Vaping    E-Cigarette Use Never User      E-Cigarette/Vaping Substances    Nicotine Yes      Social History     Tobacco Use    Smoking status: Former Smoker     Packs/day: 0 50     Types: Cigarettes    Smokeless tobacco: Never Used   Vaping Use    Vaping Use: Never used   Substance Use Topics    Alcohol use: Not Currently     Comment: occasional    Drug use: No        Review of Systems   All other systems reviewed and are negative  Physical Exam  ED Triage Vitals [03/05/22 0817]   Temperature Pulse Respirations Blood Pressure SpO2   97 6 °F (36 4 °C) 76 18 106/56 99 %      Temp Source Heart Rate Source Patient Position - Orthostatic VS BP Location FiO2 (%)   Oral Monitor Lying Right arm --      Pain Score       No Pain             Orthostatic Vital Signs  Vitals:    03/06/22 0737 03/06/22 1423 03/06/22 2212 03/07/22 0729   BP: 107/63 121/66 118/66 (!) 102/45   Pulse: 75      Patient Position - Orthostatic VS:           Physical Exam  Vitals reviewed  Constitutional:       General: He is not in acute distress  HENT:      Head: Normocephalic        Right Ear: External ear normal       Left Ear: External ear normal       Nose: Nose normal       Mouth/Throat:      Mouth: Mucous membranes are moist       Pharynx: No posterior oropharyngeal erythema  Comments: Clear oropharynx  Eyes:      Extraocular Movements: Extraocular movements intact  Conjunctiva/sclera: Conjunctivae normal    Cardiovascular:      Rate and Rhythm: Normal rate and regular rhythm  Pulmonary:      Effort: Pulmonary effort is normal       Breath sounds: Normal breath sounds  Abdominal:      General: Abdomen is flat  There is no distension  Palpations: Abdomen is soft  Tenderness: There is abdominal tenderness (mild in LLQ)  There is no guarding or rebound  Genitourinary:     Comments: Melena per rectal exam  Musculoskeletal:      Right lower leg: No edema  Left lower leg: No edema  Skin:     General: Skin is warm  Coloration: Skin is not pale  Neurological:      General: No focal deficit present  Mental Status: He is alert     Psychiatric:         Mood and Affect: Mood normal          Behavior: Behavior normal          ED Medications  Medications   pantoprazole (PROTONIX) injection 40 mg (40 mg Intravenous Refused 3/7/22 0831)   ondansetron (ZOFRAN) injection 4 mg (has no administration in time range)   albuterol (PROVENTIL HFA,VENTOLIN HFA) inhaler 1 puff (has no administration in time range)   gabapentin (NEURONTIN) capsule 600 mg (600 mg Oral Refused 3/6/22 2246)   sucralfate (CARAFATE) tablet 1 g (1 g Oral Given 3/7/22 0540)   tiZANidine (ZANAFLEX) tablet 4 mg (4 mg Oral Refused 3/7/22 0831)   acetaminophen (TYLENOL) tablet 650 mg (has no administration in time range)   pantoprazole (PROTONIX) 80 mg in sodium chloride 0 9 % 100 mL IVPB (0 mg Intravenous Stopped 3/5/22 0956)   famotidine (PEPCID) injection 20 mg (20 mg Intravenous Given 3/5/22 1141)   iohexol (OMNIPAQUE) 350 MG/ML injection (SINGLE-DOSE) 100 mL (100 mL Intravenous Given 3/5/22 1118)       Diagnostic Studies  Results Reviewed     Procedure Component Value Units Date/Time    Hemoglobin and hematocrit, blood [[id number]]  (Abnormal) Collected: 03/06/22 0831    Lab Status: Final result Specimen: Blood from Hand, Left Updated: 03/06/22 0847     Hemoglobin 11 1 g/dL      Hematocrit 32 9 %     Hemoglobin and hematocrit, blood [[id number]]  (Normal) Collected: 03/05/22 2352    Lab Status: Final result Specimen: Blood from Arm, Left Updated: 03/06/22 0015     Hemoglobin 13 2 g/dL      Hematocrit 40 2 %     Hemoglobin and hematocrit, blood [[id number]]  (Normal) Collected: 03/05/22 1612    Lab Status: Final result Specimen: Blood from Arm, Left Updated: 03/05/22 1626     Hemoglobin 12 5 g/dL      Hematocrit 36 9 %     Comprehensive metabolic panel [[id number]]  (Abnormal) Collected: 03/05/22 0824    Lab Status: Final result Specimen: Blood from Line, Venous Updated: 03/05/22 0855     Sodium 142 mmol/L      Potassium 4 1 mmol/L      Chloride 112 mmol/L      CO2 27 mmol/L      ANION GAP 3 mmol/L      BUN 26 mg/dL      Creatinine 0 84 mg/dL      Glucose 98 mg/dL      Calcium 7 8 mg/dL      Corrected Calcium 8 5 mg/dL      AST 17 U/L      ALT 24 U/L      Alkaline Phosphatase 57 U/L      Total Protein 6 0 g/dL      Albumin 3 1 g/dL      Total Bilirubin 0 87 mg/dL      eGFR 114 ml/min/1 73sq m     Narrative:      Julito guidelines for Chronic Kidney Disease (CKD):     Stage 1 with normal or high GFR (GFR > 90 mL/min/1 73 square meters)    Stage 2 Mild CKD (GFR = 60-89 mL/min/1 73 square meters)    Stage 3A Moderate CKD (GFR = 45-59 mL/min/1 73 square meters)    Stage 3B Moderate CKD (GFR = 30-44 mL/min/1 73 square meters)    Stage 4 Severe CKD (GFR = 15-29 mL/min/1 73 square meters)    Stage 5 End Stage CKD (GFR <15 mL/min/1 73 square meters)  Note: GFR calculation is accurate only with a steady state creatinine    CBC and differential [[id number]] Collected: 03/05/22 0824    Lab Status: Final result Specimen: Blood from Line, Venous Updated: 03/05/22 0831     WBC 7 26 Thousand/uL      RBC 4 38 Million/uL      Hemoglobin 12 6 g/dL      Hematocrit 37 2 %      MCV 85 fL      MCH 28 8 pg      MCHC 33 9 g/dL      RDW 12 4 %      MPV 10 5 fL      Platelets 815 Thousands/uL      nRBC 0 /100 WBCs      Neutrophils Relative 70 %      Immat GRANS % 0 %      Lymphocytes Relative 21 %      Monocytes Relative 7 %      Eosinophils Relative 1 %      Basophils Relative 1 %      Neutrophils Absolute 5 10 Thousands/µL      Immature Grans Absolute 0 02 Thousand/uL      Lymphocytes Absolute 1 50 Thousands/µL      Monocytes Absolute 0 50 Thousand/µL      Eosinophils Absolute 0 10 Thousand/µL      Basophils Absolute 0 04 Thousands/µL                  CT high volume lower GI bleed   Final Result by Dayana Jha MD (03/05 1206)      No evidence of acute active GI bleeding  Acute proximal duodenitis likely peptic ulcer disease with adjacent fat stranding and reactive adenitis  Small ill-defined hypoattenuation of the anterolateral wall of the proximal duodenum suspicious for early ulceration  No free gas or bowel    obstruction  GI consultation is advised  Nonenhancing high density fluid throughout the colon without evidence of active GI bleeding may represent hematochezia and/or recent ingestion of high density meal or medication intermixed with diarrhea  Correlate with H&H  This study demonstrates a significant  finding and was documented as such [facility] for liaison and referring practitioner notification         Workstation performed: [id number]               Procedures  Procedures      ED Course  ED Course as of 03/07/22 0954   Sat Mar 05, 2022   1048 Patient has had 2 more episodes of melenic stools while in ED                             SBIRT 20yo+      Most Recent Value   SBIRT (23 yo +)    In order to provide better care to our patients, we are screening all of our patients for alcohol and drug use  Would it be okay to ask you these screening questions? No Filed at: 03/05/2022 3202   Initial Alcohol Screen: US AUDIT-C     1  How often do you have a drink containing alcohol? 0 Filed at: 03/05/2022 7851   Audit-C Score 0 Filed at: 03/05/2022 7404                [facility]  Number of Diagnoses or Management Options  GI bleed  Hematemesis without nausea  Melena  Diagnosis management comments: 31 y o w/ history of upper GI bleed presents with a similar episode  Received IVF w/ EMS  Started Protonix  Ordered CBC, CMP, type/screen  Patient had 3 more episodes of melenic diarrhea while in ED  Reports increasing abdominal discomfort, but does not want any pain meds  Notified GI of the patient who will plan to see as inpatient for possible EGD and are available if patient deteriorates  Discussed findings with patient and mother who are agreeable with plan for admission and further evaluation and management  Disposition  Final diagnoses:   GI bleed   Hematemesis without nausea   Melena     Time reflects when diagnosis was documented in both MDM as applicable and the Disposition within this note     Time User Action Codes Description Comment    3/5/2022  1:02 PM Giovana Ramp Add [K92 2] GI bleed     3/5/2022  1:02 PM Yokubaitis, [place] Add [K92 0] Hematemesis without nausea     3/5/2022  1:02 PM Giovana Ramp Add [K92 1] Melena     3/5/2022  1:03 PM Santana Lipoma Add [K92 0] Hematemesis with nausea       ED Disposition     ED Disposition Condition Date/Time Comment    Admit Stable Sat Mar 5, 2022  1:02 PM Case was discussed with Dr Anastasiia Negrete and the patient's admission status was agreed to be Admission Status: inpatient status to the service of Dr Anastasiia Negrete           Follow-up Information    None         Current Discharge Medication List      CONTINUE these medications which have NOT CHANGED    Details   albuterol (PROVENTIL HFA,VENTOLIN HFA) 90 mcg/act inhaler Inhale 1 puff every 6 (six) hours as needed Comments: Substitution to a formulary equivalent within the same pharmaceutical class is authorized  guaiFENesin (ROBITUSSIN) 100 MG/5ML oral liquid Take 5-10 mL by mouth every 4 (four) hours as needed for cough  Qty: 60 mL, Refills: 0      ibuprofen (MOTRIN) 600 mg tablet Take 1 tablet by mouth every 6 (six) hours as needed for mild pain  Qty: 30 tablet, Refills: 0      naproxen (NAPROSYN) 500 mg tablet Take 1 tablet (500 mg total) by mouth 2 (two) times a day with meals  Qty: 14 tablet, Refills: 0    Associated Diagnoses: Dental abscess      pantoprazole (PROTONIX) 20 mg tablet Take 2 tablets (40 mg total) by mouth 2 (two) times a day  Qty: 60 tablet, Refills: 3    Associated Diagnoses: Hematemesis      sucralfate (CARAFATE) 1 g tablet Take 1 tablet (1 g total) by mouth 3 (three) times a day for 10 days  Qty: 30 tablet, Refills: 0    Associated Diagnoses: Hematemesis      !! tapentadol (NUCYNTA) 50 mg tablet Take 50 mg by mouth as needed (instant relief)      ! ! tapentadol (NUCYNTA) tablet Take 100 mg by mouth 2 (two) times a day      tiZANidine (ZANAFLEX) 4 mg tablet Take by mouth 2 (two) times a day       !! - Potential duplicate medications found  Please discuss with provider  STOP taking these medications       gabapentin (NEURONTIN) 600 MG tablet Comments:   Reason for Stopping:             No discharge procedures on file  PDMP Review       Value Time User    PDMP Reviewed  Yes 11/30/2020  2:18 AM Tere Gabriel MD           ED Provider  Attending physically available and evaluated Alexia Hagen I managed the patient along with the ED Attending      Electronically Signed by         Corrie Raphael MD  03/07/22 7780

## 2022-03-05 NOTE — ASSESSMENT & PLAN NOTE
With associated hematemesis (see above)  Prior history of GI bleed noted - seen outpatient on 12/1/20 per gastroenterology with symptoms attributed to peptic ulcer disease in the setting of a previous h/o NSAID use -> continue to encourage abstinence  H/H monitoring  Will appreciate gastroenterology evaluation

## 2022-03-05 NOTE — H&P
[address] [facility]  H&P- Annbael Manuel 1985, 31 y o  male MRN: [id number]  Unit/Bed#: [facility] 825-01 Encounter: [id number]  Primary Care Provider: Royal Cantu MD   Date and time admitted to hospital: 3/5/2022  8:13 AM      * Hematemesis  Assessment & Plan  In the setting of progression of peptic ulcer disease and prior h/o extensive NSAID use (although he states he has not used NSAIDs for a while now) - similar episode late last year   Hemoglobin on presentation of 12 6 (although may be falsely elevated in the setting of hemoconcentration from vomiting) - continue serial H/H monitoring  CT imaging report noting: "No evidence of acute active GI bleeding  Acute proximal duodenitis likely peptic ulcer disease with adjacent fat stranding and reactive adenitis  Small ill-defined hypoattenuation of the anterolateral wall of the proximal duodenum suspicious for early ulceration  No free gas or bowel obstruction  GI consultation is advised  Nonenhancing high density fluid throughout the colon without evidence of active GI bleeding may represent hematochezia and/or recent ingestion of high density meal or medication intermixed with diarrhea   Correlate with H&H "  Continue IV PPI BID and Carafate - clear liquid diet w/ NPO after midnight for tentative scoping  IV fluids on board - monitor vitals and maintain hemodynamics  PRN emesis control  Not on chronic anticoagulation or anti-platelet agents    Melena  Assessment & Plan  With associated hematemesis (see above)  Prior history of GI bleed noted - seen outpatient on 12/1/20 per gastroenterology with symptoms attributed to peptic ulcer disease in the setting of a previous h/o NSAID use -> continue to encourage abstinence  H/H monitoring  Will appreciate gastroenterology evaluation    Asthma  Assessment & Plan  Stable/asymptomatic  PRN Albuterol on board      DVT Prophylaxis: Ambulatory    Code Status: Full code    Discussion with: Patient at bedside    Anticipated Length of Stay:  Patient will be admitted on an Inpatient basis with an anticipated length of stay of greater than 2 midnights  Justification for Hospital Stay:  Hematemesis/melena requiring hemoglobin monitoring and gastroenterologic intervention/evaluation  Total Time for Visit, including Counseling / Coordination of Care: 72 minutes  Greater than 50% of this total time spent on direct patient counseling and coordination of care  Chief Complaint:  Blood in vomit in stools      History of Present Illness:    Ana Jha is a 31 y o  male who presents with complaints of episodes of hematemesis and dark tarry stools, with onset of symptoms earlier this morning shortly after waking up and using the bathroom  He recalls onset of nausea which led to vomiting with noticeable blood  He also noted episodes of diarrhea with dark stools  Denies fever/chills  Does acknowledge some mild left-sided abdominal discomfort  He then proceeded to lay on his couch where he was found passed out" by his roommate  His past medical history is notable for a similar episode of GI bleed late last year which was attributed to suspected peptic ulcer disease for which he was on a PPI and Carafate regimen  He eventually stopped his PPI regimen due to resolution of symptoms over time  In the ED, lab work revealed an essentially normal hemoglobin level and CT imaging negative for acute/ongoing bleed  At the time of my encounter, he sitting upright in bed fairly comfortably  Denies any further episodes of nausea/vomiting, although he stated few episodes of dark tinged diarrhea post-hospital arrival   He also acknowledges occasional abdominal cramping  Overall, he remains in pleasant and hopeful spirits  Review of Systems:    Review of Systems - A thorough 12 point review systems was conducted  Pertinent positives and negatives are mentioned in the history of present illness        Past Medical and Surgical History:     Past Medical History:   Diagnosis Date    Asthma        Past Surgical History:   Procedure Laterality Date    FACIAL RECONSTRUCTION SURGERY      KNEE SURGERY      NECK SURGERY           Medications & Allergies:    Prior to Admission medications    Medication Sig Start Date End Date Taking? Authorizing Provider   albuterol (PROVENTIL HFA,VENTOLIN HFA) 90 mcg/act inhaler Inhale 1 puff every 6 (six) hours as needed    Historical Provider, MD   gabapentin (NEURONTIN) 600 MG tablet Take 600 mg by mouth daily at bedtime    Historical Provider, MD   guaiFENesin (ROBITUSSIN) 100 MG/5ML oral liquid Take 5-10 mL by mouth every 4 (four) hours as needed for cough  Patient not taking: Reported on 1/4/2020 5/29/17   Deanna Reeves DO   ibuprofen (MOTRIN) 600 mg tablet Take 1 tablet by mouth every 6 (six) hours as needed for mild pain 9/6/17   Deanna Reeves DO   naproxen (NAPROSYN) 500 mg tablet Take 1 tablet (500 mg total) by mouth 2 (two) times a day with meals 9/14/21   Tyrel Sanchez MD   pantoprazole (PROTONIX) 20 mg tablet Take 2 tablets (40 mg total) by mouth 2 (two) times a day 12/1/20   Ludivina Harper MD   sucralfate (CARAFATE) 1 g tablet Take 1 tablet (1 g total) by mouth 3 (three) times a day for 10 days 11/30/20 12/10/20  Charles Peterson PA-C   tapentadol (NUCYNTA) 50 mg tablet Take 50 mg by mouth as needed (instant relief)    Historical Provider, MD   tapentadol (NUCYNTA) tablet Take 100 mg by mouth 2 (two) times a day    Historical Provider, MD   tiZANidine (ZANAFLEX) 4 mg tablet Take by mouth 2 (two) times a day    Historical Provider, MD         Allergies:    Allergies   Allergen Reactions    Augmentin [Amoxicillin-Pot Clavulanate] Rash         Social History:    Substance Use History:   Social History     Substance and Sexual Activity   Alcohol Use Not Currently    Comment: occasional     Social History     Tobacco Use   Smoking Status Former Smoker    Packs/day: 0 50    Types: Cigarettes   Smokeless Tobacco Never Used     Social History     Substance and Sexual Activity   Drug Use No         Family History:    Noncontributory      Physical Exam:     Vitals:   Blood Pressure: 106/56 (03/05/22 0817)  Pulse: 76 (03/05/22 0817)  Temperature: 97 6 °F (36 4 °C) (03/05/22 0817)  Temp Source: Oral (03/05/22 0817)  Respirations: 18 (03/05/22 0817)  Height: 5' 7" (170 2 cm) (03/05/22 0817)  Weight - Scale: 81 6 kg (180 lb) (03/05/22 0817)  SpO2: 99 % (03/05/22 0817)      GENERAL:  Well-developed/nourished   HEAD:  Normocephalic - atraumatic  EYES: PERRL - EOMI   MOUTH:  Mucosa moist  NECK:  Supple - full range of motion  CARDIAC:  Rate controlled - S1/S2 positive  PULMONARY:  Clear breath sounds bilaterally - nonlabored respirations  ABDOMEN:  Soft - mild epigastric and bilateral lower abdominal bandlike tenderness - nondistended - active bowel sounds  MUSCULOSKELETAL:  Motor strength/range of motion intact  NEUROLOGIC:  Alert/oriented at baseline  SKIN:  Chronic wrinkles/blemishes   PSYCHIATRIC:  Mood/affect stable      Additional Data:     Labs & Recent Cultures:    Results from last 7 days   Lab Units 03/05/22  0824   WBC Thousand/uL 7 26   HEMOGLOBIN g/dL 12 6   HEMATOCRIT % 37 2   PLATELETS Thousands/uL 206   NEUTROS PCT % 70   LYMPHS PCT % 21   MONOS PCT % 7   EOS PCT % 1     Results from last 7 days   Lab Units 03/05/22  0824   SODIUM mmol/L 142   POTASSIUM mmol/L 4 1   CHLORIDE mmol/L 112*   CO2 mmol/L 27   BUN mg/dL 26*   CREATININE mg/dL 0 84   ANION GAP mmol/L 3*   CALCIUM mg/dL 7 8*   ALBUMIN g/dL 3 1*   TOTAL BILIRUBIN mg/dL 0 87   ALK PHOS U/L 57   ALT U/L 24   AST U/L 17   GLUCOSE RANDOM mg/dL 98             Imaging:     CT high volume lower GI bleed    Result Date: 3/5/2022  Narrative: CT ABDOMEN AND PELVIS - WITHOUT AND WITH IV CONTRAST INDICATION:   GI bleed, abdominal pain    COMPARISON: CT abdomen and pelvis 11/30/2020 TECHNIQUE:  CT examination of the abdomen and pelvis was performed both prior to and after the administration of intravenous contrast  Axial, sagittal, and coronal 2D reformatted images were created from the source data and submitted for interpretation  Radiation dose length product (DLP) for this visit:  2742 79 mGy-cm   This examination, like all CT scans performed in the [facility], was performed utilizing techniques to minimize radiation dose exposure, including the use of iterative reconstruction and automated exposure control  IV Contrast:  100 mL of iohexol (OMNIPAQUE)  350 Enteric Contrast:  Enteric contrast was not administered  FINDINGS: ABDOMEN  BOWEL:  There is no active extravasation of intravenous contrast into the lumen of stomach, small bowel, or large bowel loops  Minimal thickening of the proximal duodenum with mild mucosal hyperemia  Focal ill-defined hypoattenuation at the right anterolateral wall of the junction of the duodenal bulb and second duodenal segment with adjacent fat stranding images 235 through 236 series 3  Nonspecific thickening of the stomach may be due to gastritis or under distention  Nonenhancing high density fluid present throughout the colon without abnormal colonic wall thickening  Unremarkable aorta, celiac, superior mesenteric, duplicated bilateral renal, inferior mesenteric and iliac arteries  Retroaortic left renal vein and accessory left infrarenal IVC, normal variants  LOWER CHEST:  No significant abnormality in the lung bases  LIVER/BILIARY TREE:  Unremarkable  GALLBLADDER:  No calcified gallstones  No pericholecystic inflammatory change  SPLEEN:  Unremarkable  PANCREAS:  Unremarkable  ADRENAL GLANDS:  Unremarkable  KIDNEYS/URETERS:  Unremarkable  No hydronephrosis  PELVIS REPRODUCTIVE ORGANS:  Unremarkable for patient's age  URINARY BLADDER:  Stable punctate calcification midline dome of the bladder  Otherwise unremarkable  APPENDIX: A normal appendix was visualized   ADDITIONAL ABDOMINAL AND PELVIC STRUCTURES ABDOMINOPELVIC CAVITY:  Interval enlargement of few subcentimeter short axis lymph nodes adjacent to the proximal duodenum; representative lymph node anterior to the second duodenal segment measures 9 mm short axis image 241 series 3 previously 3 mm  No pneumoperitoneum or free fluid  ABDOMINAL WALL/INGUINAL REGIONS:  Stable small fat-containing umbilical hernia  OSSEOUS STRUCTURES:  Stable small fat-containing umbilical hernia  Impression: No evidence of acute active GI bleeding  Acute proximal duodenitis likely peptic ulcer disease with adjacent fat stranding and reactive adenitis  Small ill-defined hypoattenuation of the anterolateral wall of the proximal duodenum suspicious for early ulceration  No free gas or bowel obstruction  GI consultation is advised  Nonenhancing high density fluid throughout the colon without evidence of active GI bleeding may represent hematochezia and/or recent ingestion of high density meal or medication intermixed with diarrhea  Correlate with H&H  This study demonstrates a significant  finding and was documented as such in [facility] for liaison and referring practitioner notification  Workstation performed: [id number]                     ** Please Note: This note is constructed using a voice recognition dictation system  An occasional wrong word/phrase or sound-a-like substitution may have been picked up by dictation device due to the inherent limitations of voice recognition software  Read the chart carefully and recognize, using reasonable context, where substitutions may have occurred  **

## 2022-03-05 NOTE — LETTER
[address] [facility] [address]  [address] [place] [id number]  Dept: [phone]    March 7, 2022     Patient: Annabel Manuel   YOB: 1985   Date of Visit: 3/5/2022       To Whom it May Concern:    Fifi Burgos is under my professional care  He was seen in the hospital from 3/5/2022   to 03/07/22  He may return to work on 3/9/22 without limitations  If you have any questions or concerns, please don't hesitate to call           Sincerely,          Amanuel Copeland MD

## 2022-03-05 NOTE — ED ATTENDING ATTESTATION
Final Diagnosis:  1  GI bleed    2  Hematemesis without nausea    3  Melena    4  Hematemesis with nausea    5  Hematemesis           I, Ortega Ferrell MD, saw and evaluated the patient  All available labs and X-rays were ordered by me or the resident and have been reviewed by myself  I discussed the patient with the resident / non-physician and agree with the resident's / non-physician practitioner's findings and plan as documented in the resident's / non-physician practicitioner's note, except where noted  At this point, I agree with the current assessment done in the ED  I was present during key portions of all procedures performed unless otherwise stated  Chief Complaint   Patient presents with    Vomiting Blood     Patient woke up around  and went to the bathroom  Was vomiting blood and having diarrhea with blood in stool  Was found "passed out" on couch by roomate  Denies any fevers/chills/N/D/CP/SOB at this time  Diarrhea     This is a 31 y o  male presenting for evaluation of vomiting / maybe gastric ulcer  This morning, he woke up and felt what he felt last year that was a possible ulcer  He felt nauseous, had maroon vomit, sat on toilet with melanotic stool / diarrhea  This week he did have a longer week (65 hours this week) and has been much more stressed but otherwise felt fine  Denies f/ch/cp/sob  +LLQ belly pain  No sick contacts  "passed out" on couch by roommate  Denies NSAID use / abuse  Has been losing weight but is trying to do so  PMH:  Presumed gastric ulcer last year with vomiting blood + diarrhea with blood, prescribed GI f/u + protonix and had improvement  has a past medical history of Acute GI bleeding, Asthma, and Facial trauma  PSH:   has a past surgical history that includes Neck surgery; Facial reconstruction surgery; Knee surgery; and EGD      Social:  Social History     Substance and Sexual Activity   Alcohol Use Yes    Comment: occasional     Social History     Tobacco Use   Smoking Status Former Smoker    Packs/day: 0 50    Types: Cigarettes   Smokeless Tobacco Never Used   Tobacco Comment    quit smoking cigaretyes almost a year ago     Social History     Substance and Sexual Activity   Drug Use No     PE:  Vitals:    03/07/22 0729 03/07/22 1107 03/07/22 1142 03/07/22 1157   BP: (!) 102/45 119/77 129/74 109/83   Pulse:  81 (!) 120 99   Resp: 15 18 20 18   Temp: 97 8 °F (36 6 °C) 99 1 °F (37 3 °C) 98 7 °F (37 1 °C)    TempSrc:  Tympanic Tympanic    SpO2:  100% 98% 98%   Weight:  81 6 kg (180 lb)     Height:  5' 7" (1 702 m)     General: VSS, NAD, awake, alert  Well-nourished, well-developed  Appears stated age  Head: Normocephalic, atraumatic, nontender  Eyes: PERRL, EOM-I  No diplopia  No hyphema  No subconjunctival hemorrhages  Symmetrical lids  ENTAtraumatic external nose and ears  MMM  No stridor  Normal phonation  No drooling  Base of mouth is soft  No mastoid tenderness  Neck: Symmetric, trachea midline  No JVD  CV: Peripheral pulses +2 throughout  No chest wall tenderness  Lungs:   Unlabored   No retractions  No crepitus  No tachypnea  No paradoxical motion  Abd: +BS, soft, NT/ND  No guarding  No rigidity  Rectal exam w/ melena per resident  MSK:   FROM   No lower extremity edema  Back:   No CVAT  Skin: Dry, intact  No abrasions, lacerations  Neuro: AAOx3, GCS 15, CN II-XII grossly intact  Motor grossly intact  Psychiatric/Behavioral: Appropriate mood and affect   Exam: deferred  A:  - N/V  - melanotic stool + hematmesis x1  P:  - labs  - Protonix  - concern for symptomatic ulcer   - Reassuring exam  Not consistent with ischemic bowel  Doubt diverticulitis  - Offered admission for GI evaluation but stated that he is well appearing enough, can DC w/ close outpatient f/u with GI  RTER precautions  - 13 point ROS was performed and all are normal unless stated in the history above     - Nursing note reviewed  Vitals reviewed  - Orders placed by myself and/or advanced practitioner / resident     - Previous chart was reviewed  - No language barrier    - History obtained from patient  - There are no limitations to the history obtained  - Critical care time: Not applicable for this patient  Code Status: Prior  Advance Directive and Living Will:      Power of :    POLST:      Medications   pantoprazole (PROTONIX) 80 mg in sodium chloride 0 9 % 100 mL IVPB (0 mg Intravenous Stopped 3/5/22 0956)   famotidine (PEPCID) injection 20 mg (20 mg Intravenous Given 3/5/22 1141)   iohexol (OMNIPAQUE) 350 MG/ML injection (SINGLE-DOSE) 100 mL (100 mL Intravenous Given 3/5/22 1118)     CT high volume lower GI bleed   Final Result      No evidence of acute active GI bleeding  Acute proximal duodenitis likely peptic ulcer disease with adjacent fat stranding and reactive adenitis  Small ill-defined hypoattenuation of the anterolateral wall of the proximal duodenum suspicious for early ulceration  No free gas or bowel    obstruction  GI consultation is advised  Nonenhancing high density fluid throughout the colon without evidence of active GI bleeding may represent hematochezia and/or recent ingestion of high density meal or medication intermixed with diarrhea  Correlate with H&H  This study demonstrates a significant  finding and was documented as such in [facility] for liaison and referring practitioner notification         Workstation performed: [id number]           Orders Placed This Encounter   Procedures    H  pylori antigen, stool    CT high volume lower GI bleed    CBC and differential    Comprehensive metabolic panel    Hemoglobin and hematocrit, blood    CBC and Platelet    Ambulatory referral to Gastroenterology    Discharge Diet    Notify admitting physician    Notify admitting physician on arrival    Activity as tolerated    Call provider for:  severe uncontrolled pain    Call provider for: active or persistent bleeding    Call provider for:  persistent dizziness or light-headedness    Inpatient consult to gastroenterology    Type and screen    ABORh Recheck - Contact Blood Bank Prior to Collection    Inpatient Admission    Discharge patient    EGD     Labs Reviewed   COMPREHENSIVE METABOLIC PANEL - Abnormal       Result Value Ref Range Status    Sodium 142  136 - 145 mmol/L Final    Potassium 4 1  3 5 - 5 3 mmol/L Final    Chloride 112 (*) 100 - 108 mmol/L Final    CO2 27  21 - 32 mmol/L Final    ANION GAP 3 (*) 4 - 13 mmol/L Final    BUN 26 (*) 5 - 25 mg/dL Final    Creatinine 0 84  0 60 - 1 30 mg/dL Final    Comment: Standardized to IDMS reference method    Glucose 98  65 - 140 mg/dL Final    Comment: If the patient is fasting, the ADA then defines impaired fasting glucose as > 100 mg/dL and diabetes as > or equal to 123 mg/dL  Specimen collection should occur prior to Sulfasalazine administration due to the potential for falsely depressed results  Specimen collection should occur prior to Sulfapyridine administration due to the potential for falsely elevated results  Calcium 7 8 (*) 8 3 - 10 1 mg/dL Final    Corrected Calcium 8 5  8 3 - 10 1 mg/dL Final    AST 17  5 - 45 U/L Final    Comment: Specimen collection should occur prior to Sulfasalazine administration due to the potential for falsely depressed results  ALT 24  12 - 78 U/L Final    Comment: Specimen collection should occur prior to Sulfasalazine administration due to the potential for falsely depressed results  Alkaline Phosphatase 57  46 - 116 U/L Final    Total Protein 6 0 (*) 6 4 - 8 2 g/dL Final    Albumin 3 1 (*) 3 5 - 5 0 g/dL Final    Total Bilirubin 0 87  0 20 - 1 00 mg/dL Final    Comment: Use of this assay is not recommended for patients undergoing treatment with eltrombopag due to the potential for falsely elevated results      eGFR 114  ml/min/1 73sq m Final    Narrative:     Julito guidelines for Chronic Kidney Disease (CKD):     Stage 1 with normal or high GFR (GFR > 90 mL/min/1 73 square meters)    Stage 2 Mild CKD (GFR = 60-89 mL/min/1 73 square meters)    Stage 3A Moderate CKD (GFR = 45-59 mL/min/1 73 square meters)    Stage 3B Moderate CKD (GFR = 30-44 mL/min/1 73 square meters)    Stage 4 Severe CKD (GFR = 15-29 mL/min/1 73 square meters)    Stage 5 End Stage CKD (GFR <15 mL/min/1 73 square meters)  Note: GFR calculation is accurate only with a steady state creatinine   CBC AND DIFFERENTIAL    WBC 7 26  4 31 - 10 16 Thousand/uL Final    RBC 4 38  3 88 - 5 62 Million/uL Final    Hemoglobin 12 6  12 0 - 17 0 g/dL Final    Hematocrit 37 2  36 5 - 49 3 % Final    MCV 85  82 - 98 fL Final    MCH 28 8  26 8 - 34 3 pg Final    MCHC 33 9  31 4 - 37 4 g/dL Final    RDW 12 4  11 6 - 15 1 % Final    MPV 10 5  8 9 - 12 7 fL Final    Platelets 678  200 - 390 Thousands/uL Final    nRBC 0  /100 WBCs Final    Neutrophils Relative 70  43 - 75 % Final    Immat GRANS % 0  0 - 2 % Final    Lymphocytes Relative 21  14 - 44 % Final    Monocytes Relative 7  4 - 12 % Final    Eosinophils Relative 1  0 - 6 % Final    Basophils Relative 1  0 - 1 % Final    Neutrophils Absolute 5 10  1 85 - 7 62 Thousands/µL Final    Immature Grans Absolute 0 02  0 00 - 0 20 Thousand/uL Final    Lymphocytes Absolute 1 50  0 60 - 4 47 Thousands/µL Final    Monocytes Absolute 0 50  0 17 - 1 22 Thousand/µL Final    Eosinophils Absolute 0 10  0 00 - 0 61 Thousand/µL Final    Basophils Absolute 0 04  0 00 - 0 10 Thousands/µL Final   TYPE AND SCREEN    ABO Grouping O   Final    Rh Factor Positive   Final    Antibody Screen Negative   Final    Specimen Expiration Date [id number]   Final   ABORH RECHECK    ABO Grouping O   Final    Rh Factor Positive   Final     Time reflects when diagnosis was documented in both MDM as applicable and the Disposition within this note       Time User Action Codes Description Comment    3/5/2022  1:02 PM Jaguar Blow Add [K92 2] GI bleed     3/5/2022  1:02 PM Raina, Antoine Add [K92 0] Hematemesis without nausea     3/5/2022  1:02 PM Jaguar Blow Add [K92 1] Melena     3/5/2022  1:03 PM Cynthia Glass Add [K92 0] Hematemesis with nausea     3[facility]7/2022 11:[facility] [K92 2] GI bleed     3/7/2022 11:34 AM Alcario Sury Carter Modify [K92 0] Hematemesis without nausea     3/7/2022 11:34 AM [facility] [K92 1] Melena     3/7/2022 11:34 AM AlcSury Huang Modify [K92 0] Hematemesis with nausea     3/7/2022  2:39 PM Dumaswala, Lissette B Modify [K92 2] GI bleed     3/7/2022  2:39 PM Dumaswala, Lissette B Modify [K92 0] Hematemesis without nausea     3/7/2022  2:39 PM Dumaswala, Lissette B Modify [K92 1] Melena     3/7/2022  2:39 PM Dumaswala, Lissette B Modify [K92 0] Hematemesis with nausea     3/7/2022  2:39 PM Dumaswala, Lissette B Add [K92 0] Hematemesis     3/7/2022  2:40 PM Dumaswala, Lissette B Modify [K92 0] Hematemesis     3/7/2022  2:41 PM Dumaswala, Lissette B Modify [K92 0] Hematemesis     3/7/2022  2:42 PM Dumaswala, Lissette B Modify [K92 0] Hematemesis           ED Disposition       ED Disposition Condition Date/Time Comment    Admit Stable Sat Mar 5, 2022  1:02 PM Case was discussed with Dr Soco Marroquin and the patient's admission status was agreed to be Admission Status: inpatient status to the service of Dr Soco Marroquin               Follow-up Information       Follow up With Specialties Details Why Contact Info    Jonnie Medeiros MD Internal Medicine Schedule an appointment as soon as possible for a visit in 1 week([phone]  [place] [address]  [phone]            Discharge Medication List as of 3/7/2022  3:46 PM        CONTINUE these medications which have CHANGED    Details   pantoprazole (PROTONIX) 40 mg tablet Take 1 tablet (40 mg total) by mouth 2 (two) times a day, Starting Mon 3/7/2022, Until Wed 4/6/2022, Print      sucralfate (CARAFATE) 1 g tablet Take 1 tablet (1 g total) by mouth 4 (four) times a day, Starting Mon 3/7/2022, Until Wed 4/6/2022, Print           CONTINUE these medications which have NOT CHANGED    Details   albuterol (PROVENTIL HFA,VENTOLIN HFA) 90 mcg/act inhaler Inhale 1 puff every 6 (six) hours as needed, Historical Med      guaiFENesin (ROBITUSSIN) 100 MG/5ML oral liquid Take 5-10 mL by mouth every 4 (four) hours as needed for cough, Starting 5/29/2017, Until Discontinued, Print      !! tapentadol (NUCYNTA) 50 mg tablet Take 50 mg by mouth as needed (instant relief), Until Discontinued, Historical Med      !! tapentadol (NUCYNTA) tablet Take 100 mg by mouth 2 (two) times a day, Until Discontinued, Historical Med      tiZANidine (ZANAFLEX) 4 mg tablet Take by mouth 2 (two) times a day, Historical Med       !! - Potential duplicate medications found  Please discuss with provider  STOP taking these medications       gabapentin (NEURONTIN) 600 MG tablet Comments:   Reason for Stopping:         ibuprofen (MOTRIN) 600 mg tablet Comments:   Reason for Stopping:         naproxen (NAPROSYN) 500 mg tablet Comments:   Reason for Stopping:             Outpatient Discharge Orders   Ambulatory referral to Gastroenterology   Standing Status: Future Standing Exp  Date: 03/07/23      Discharge Diet     Activity as tolerated     Call provider for:  severe uncontrolled pain     Call provider for: active or persistent bleeding     Call provider for:  persistent dizziness or light-headedness     Prior to Admission Medications   Prescriptions Last Dose Informant Patient Reported? Taking?    albuterol (PROVENTIL HFA,VENTOLIN HFA) 90 mcg/act inhaler   Yes No   Sig: Inhale 1 puff every 6 (six) hours as needed   guaiFENesin (ROBITUSSIN) 100 MG/5ML oral liquid   No No   Sig: Take 5-10 mL by mouth every 4 (four) hours as needed for cough   Patient not taking: Reported on 1/4/2020   ibuprofen (MOTRIN) 600 mg tablet   No No   Sig: Take 1 tablet by mouth every 6 (six) hours as needed for mild pain naproxen (NAPROSYN) 500 mg tablet   No No   Sig: Take 1 tablet (500 mg total) by mouth 2 (two) times a day with meals   pantoprazole (PROTONIX) 20 mg tablet   No No   Sig: Take 2 tablets (40 mg total) by mouth 2 (two) times a day   sucralfate (CARAFATE) 1 g tablet   No No   Sig: Take 1 tablet (1 g total) by mouth 3 (three) times a day for 10 days   tapentadol (NUCYNTA) 50 mg tablet   Yes No   Sig: Take 50 mg by mouth as needed (instant relief)   tapentadol (NUCYNTA) tablet   Yes No   Sig: Take 100 mg by mouth 2 (two) times a day   tiZANidine (ZANAFLEX) 4 mg tablet   Yes No   Sig: Take by mouth 2 (two) times a day      Facility-Administered Medications: None       Portions of the record may have been created with voice recognition software  Occasional wrong word or "sound a like" substitutions may have occurred due to the inherent limitations of voice recognition software  Read the chart carefully and recognize, using context, where substitutions have occurred      Electronically signed by:  Josselin Douglas

## 2022-03-06 LAB
HCT VFR BLD AUTO: 32.9 % (ref 36.5–49.3)
HCT VFR BLD AUTO: 34.7 % (ref 36.5–49.3)
HCT VFR BLD AUTO: 40.2 % (ref 36.5–49.3)
HGB BLD-MCNC: 11.1 G/DL (ref 12–17)
HGB BLD-MCNC: 11.7 G/DL (ref 12–17)
HGB BLD-MCNC: 13.2 G/DL (ref 12–17)

## 2022-03-06 PROCEDURE — 85014 HEMATOCRIT: CPT | Performed by: INTERNAL MEDICINE

## 2022-03-06 PROCEDURE — 85018 HEMOGLOBIN: CPT | Performed by: INTERNAL MEDICINE

## 2022-03-06 PROCEDURE — C9113 INJ PANTOPRAZOLE SODIUM, VIA: HCPCS | Performed by: INTERNAL MEDICINE

## 2022-03-06 PROCEDURE — 85018 HEMOGLOBIN: CPT | Performed by: HOSPITALIST

## 2022-03-06 PROCEDURE — 99254 IP/OBS CNSLTJ NEW/EST MOD 60: CPT | Performed by: INTERNAL MEDICINE

## 2022-03-06 PROCEDURE — 85014 HEMATOCRIT: CPT | Performed by: HOSPITALIST

## 2022-03-06 PROCEDURE — 99232 SBSQ HOSP IP/OBS MODERATE 35: CPT | Performed by: HOSPITALIST

## 2022-03-06 RX ADMIN — SUCRALFATE 1 G: 1 TABLET ORAL at 23:04

## 2022-03-06 RX ADMIN — TIZANIDINE 4 MG: 4 TABLET ORAL at 17:05

## 2022-03-06 RX ADMIN — SUCRALFATE 1 G: 1 TABLET ORAL at 05:15

## 2022-03-06 RX ADMIN — PANTOPRAZOLE SODIUM 40 MG: 40 INJECTION, POWDER, FOR SOLUTION INTRAVENOUS at 08:32

## 2022-03-06 RX ADMIN — SODIUM CHLORIDE 125 ML/HR: 9 INJECTION, SOLUTION INTRAVENOUS at 05:15

## 2022-03-06 RX ADMIN — SUCRALFATE 1 G: 1 TABLET ORAL at 17:05

## 2022-03-06 RX ADMIN — TIZANIDINE 4 MG: 4 TABLET ORAL at 08:32

## 2022-03-06 RX ADMIN — PANTOPRAZOLE SODIUM 40 MG: 40 INJECTION, POWDER, FOR SOLUTION INTRAVENOUS at 20:24

## 2022-03-06 RX ADMIN — SUCRALFATE 1 G: 1 TABLET ORAL at 11:38

## 2022-03-06 NOTE — PLAN OF CARE
Problem: Potential for Falls  Goal: Patient will remain free of falls  Description: INTERVENTIONS:  - Educate patient/family on patient safety including physical limitations  - Instruct patient to call for assistance with activity   - Consult OT/PT to assist with strengthening/mobility   - Keep Call bell within reach  - Keep bed low and locked with side rails adjusted as appropriate  - Keep care items and personal belongings within reach  - Initiate and maintain comfort rounds  - Make Fall Risk Sign visible to staff  - Apply yellow socks and bracelet for high fall risk patients  - Consider moving patient to room near nurses station  Outcome: Progressing     Problem: GASTROINTESTINAL - ADULT  Goal: Minimal or absence of nausea and/or vomiting  Description: INTERVENTIONS:  - Administer IV fluids if ordered to ensure adequate hydration  - Maintain NPO status until nausea and vomiting are resolved  - Nasogastric tube if ordered  - Administer ordered antiemetic medications as needed  - Provide nonpharmacologic comfort measures as appropriate  - Advance diet as tolerated, if ordered  - Consider nutrition services referral to assist patient with adequate nutrition and appropriate food choices  Outcome: Progressing  Goal: Maintains or returns to baseline bowel function  Description: INTERVENTIONS:  - Assess bowel function  - Encourage oral fluids to ensure adequate hydration  - Administer IV fluids if ordered to ensure adequate hydration  - Administer ordered medications as needed  - Encourage mobilization and activity  - Consider nutritional services referral to assist patient with adequate nutrition and appropriate food choices  Outcome: Progressing  Goal: Maintains adequate nutritional intake  Description: INTERVENTIONS:  - Monitor percentage of each meal consumed  - Identify factors contributing to decreased intake, treat as appropriate  - Assist with meals as needed  - Monitor I&O, weight, and lab values if indicated  - Obtain nutrition services referral as needed  Outcome: Progressing  Goal: Oral mucous membranes remain intact  Description: INTERVENTIONS  - Assess oral mucosa and hygiene practices  - Implement preventative oral hygiene regimen  - Implement oral medicated treatments as ordered  - Initiate Nutrition services referral as needed  Outcome: Progressing     Problem: HEMATOLOGIC - ADULT  Goal: Maintains hematologic stability  Description: INTERVENTIONS  - Assess for signs and symptoms of bleeding or hemorrhage  - Monitor labs  - Administer supportive blood products/factors as ordered and appropriate  Outcome: Progressing     Problem: PAIN - ADULT  Goal: Verbalizes/displays adequate comfort level or baseline comfort level  Description: Interventions:  - Encourage patient to monitor pain and request assistance  - Assess pain using appropriate pain scale  - Administer analgesics based on type and severity of pain and evaluate response  - Implement non-pharmacological measures as appropriate and evaluate response  - Consider cultural and social influences on pain and pain management  - Notify physician/advanced practitioner if interventions unsuccessful or patient reports new pain  Outcome: Progressing     Problem: INFECTION - ADULT  Goal: Absence or prevention of progression during hospitalization  Description: INTERVENTIONS:  - Assess and monitor for signs and symptoms of infection  - Monitor lab/diagnostic results  - Monitor all insertion sites, i e  indwelling lines, tubes, and drains  - Monitor endotracheal if appropriate and nasal secretions for changes in amount and color  - Dobson appropriate cooling/warming therapies per order  - Administer medications as ordered  - Instruct and encourage patient and family to use good hand hygiene technique  - Identify and instruct in appropriate isolation precautions for identified infection/condition  Outcome: Progressing  Goal: Absence of fever/infection during neutropenic period  Description: INTERVENTIONS:  - Monitor WBC    Outcome: Progressing     Problem: SAFETY ADULT  Goal: Patient will remain free of falls  Description: INTERVENTIONS:  - Educate patient/family on patient safety including physical limitations  - Instruct patient to call for assistance with activity   - Consult OT/PT to assist with strengthening/mobility   - Keep Call bell within reach  - Keep bed low and locked with side rails adjusted as appropriate  - Keep care items and personal belongings within reach  - Initiate and maintain comfort rounds  - Make Fall Risk Sign visible to staff  - Apply yellow socks and bracelet for high fall risk patients  - Consider moving patient to room near nurses station  Outcome: Progressing  Goal: Maintain or return to baseline ADL function  Description: INTERVENTIONS:  -  Assess patient's ability to carry out ADLs; assess patient's baseline for ADL function and identify physical deficits which impact ability to perform ADLs (bathing, care of mouth/teeth, toileting, grooming, dressing, etc )  - Assess/evaluate cause of self-care deficits   - Assess range of motion  - Assess patient's mobility; develop plan if impaired  - Assess patient's need for assistive devices and provide as appropriate  - Encourage maximum independence but intervene and supervise when necessary  - Involve family in performance of ADLs  - Assess for home care needs following discharge   - Consider OT consult to assist with ADL evaluation and planning for discharge  - Provide patient education as appropriate  Outcome: Progressing  Goal: Maintains/Returns to pre admission functional level  Description: INTERVENTIONS:  - Perform BMAT or MOVE assessment daily    - Set and communicate daily mobility goal to care team and patient/family/caregiver     - Collaborate with rehabilitation services on mobility goals if consulted  - Out of bed for toileting  - Record patient progress and toleration of activity level   Outcome: Progressing     Problem: DISCHARGE PLANNING  Goal: Discharge to home or other facility with appropriate resources  Description: INTERVENTIONS:  - Identify barriers to discharge w/patient and caregiver  - Arrange for needed discharge resources and transportation as appropriate  - Identify discharge learning needs (meds, wound care, etc )  - Arrange for interpretive services to assist at discharge as needed  - Refer to Case Management Department for coordinating discharge planning if the patient needs post-hospital services based on physician/advanced practitioner order or complex needs related to functional status, cognitive ability, or social support system  Outcome: Progressing     Problem: Knowledge Deficit  Goal: Patient/family/caregiver demonstrates understanding of disease process, treatment plan, medications, and discharge instructions  Description: Complete learning assessment and assess knowledge base    Interventions:  - Provide teaching at level of understanding  - Provide teaching via preferred learning methods  Outcome: Progressing

## 2022-03-06 NOTE — ASSESSMENT & PLAN NOTE
In the setting of progression of peptic ulcer disease and prior h/o extensive NSAID use (although he states he has not used NSAIDs for a while now) - similar episode late last year   He had stopped taking PPI once he felt better  CT imaging report noting: "No evidence of acute active GI bleeding  Acute proximal duodenitis likely peptic ulcer disease with adjacent fat stranding and reactive adenitis  Small ill-defined hypoattenuation of the anterolateral wall of the proximal duodenum suspicious for early ulceration  No free gas or bowel obstruction  GI consultation is advised  Nonenhancing high density fluid throughout the colon without evidence of active GI bleeding may represent hematochezia and/or recent ingestion of high density meal or medication intermixed with diarrhea   Correlate with H&H "  HB dropped to 11 1, no more vomit or dark stool  Hb in PM & in am  Monitor vomit & stool  Seen by GI, NPO after MN, EGD tomorrow  Cont PPI IV BID, carafate

## 2022-03-06 NOTE — PROGRESS NOTES
[facility]  Progress Note - Megan Smith 1985, 31 y o  male MRN: [id number]  Unit/Bed#: [facility] 825-01 Encounter: [id number]  Primary Care Provider: Bindu Mittal MD   Date and time admitted to hospital: 3/5/2022  8:13 AM    Asthma  Assessment & Plan  Stable/asymptomatic  PRN Albuterol on board    Melena  Assessment & Plan  As above    * Hematemesis  Assessment & Plan  In the setting of progression of peptic ulcer disease and prior h/o extensive NSAID use (although he states he has not used NSAIDs for a while now) - similar episode late last year   He had stopped taking PPI once he felt better  CT imaging report noting: "No evidence of acute active GI bleeding  Acute proximal duodenitis likely peptic ulcer disease with adjacent fat stranding and reactive adenitis  Small ill-defined hypoattenuation of the anterolateral wall of the proximal duodenum suspicious for early ulceration  No free gas or bowel obstruction  GI consultation is advised  Nonenhancing high density fluid throughout the colon without evidence of active GI bleeding may represent hematochezia and/or recent ingestion of high density meal or medication intermixed with diarrhea  Correlate with H&H "  HB dropped to 11 1, no more vomit or dark stool  Hb in PM & in am  Monitor vomit & stool  Seen by GI, NPO after MN, EGD tomorrow  Cont PPI IV BID, carafate        VTE Pharmacologic Prophylaxis:   Moderate Risk (Score 3-4) - Pharmacological DVT Prophylaxis Contraindicated  Sequential Compression Devices Ordered  Patient Centered Rounds: I performed bedside rounds with nursing staff today  Discussions with Specialists or Other Care Team Provider:     Education and Discussions with Family / Patient: patient  Time Spent for Care: 30 minutes  More than 50% of total time spent on counseling and coordination of care as described above      Current Length of Stay: 1 day(s)  Current Patient Status: Inpatient Certification Statement: The patient will continue to require additional inpatient hospital stay due to GI bleed  Discharge Plan: Anticipate discharge in 24-48 hrs to home  Code Status: Level 1 - Full Code    Subjective:   No more vomit or BM since yesterday  No CP or SOB or dizziness    Objective:     Vitals:   Temp (24hrs), Av °F (36 7 °C), Min:97 7 °F (36 5 °C), Max:98 3 °F (36 8 °C)    Temp:  [97 7 °F (36 5 °C)-98 3 °F (36 8 °C)] 97 9 °F (36 6 °C)  HR:  [75-92] 75  Resp:  [16-20] 20  BP: (107-121)/(62-66) 121/66  SpO2:  [97 %-100 %] 97 %  Body mass index is 28 19 kg/m²  Input and Output Summary (last 24 hours): Intake/Output Summary (Last 24 hours) at 3/6/2022 1520  Last data filed at 3/6/2022 0514  Gross per 24 hour   Intake  5 ml   Output --   Net  5 ml       Physical Exam:   Physical Exam  Vitals reviewed  HENT:      Head: Normocephalic and atraumatic  Cardiovascular:      Rate and Rhythm: Normal rate and regular rhythm  Heart sounds: Normal heart sounds  Pulmonary:      Effort: Pulmonary effort is normal  No respiratory distress  Breath sounds: Normal breath sounds  No wheezing  Abdominal:      General: There is no distension  Palpations: Abdomen is soft  Tenderness: There is no abdominal tenderness  Musculoskeletal:      Right lower leg: No edema  Left lower leg: No edema  Neurological:      Mental Status: He is alert and oriented to person, place, and time  Additional Data:     Labs:  Results from last 7 days   Lab Units 22  0831 22  1612 22  0824   WBC Thousand/uL  --   --  7 26   HEMOGLOBIN g/dL 11 1*   < > 12 6   HEMATOCRIT % 32 9*   < > 37 2   PLATELETS Thousands/uL  --   --  206   NEUTROS PCT %  --   --  70   LYMPHS PCT %  --   --  21   MONOS PCT %  --   --  7   EOS PCT %  --   --  1    < > = values in this interval not displayed       Results from last 7 days   Lab Units 22  0824   SODIUM mmol/L 142 POTASSIUM mmol/L 4 1   CHLORIDE mmol/L 112*   CO2 mmol/L 27   BUN mg/dL 26*   CREATININE mg/dL 0 84   ANION GAP mmol/L 3*   CALCIUM mg/dL 7 8*   ALBUMIN g/dL 3 1*   TOTAL BILIRUBIN mg/dL 0 87   ALK PHOS U/L 57   ALT U/L 24   AST U/L 17   GLUCOSE RANDOM mg/dL 98                       Lines/Drains:  Invasive Devices  Report    Peripheral Intravenous Line            Peripheral IV 03/06/22 Right Forearm <1 day                      Imaging: No pertinent imaging reviewed  Recent Cultures (last 7 days):         Last 24 Hours Medication List:   Current Facility-Administered Medications   Medication Dose Route Frequency Provider Last Rate    acetaminophen  650 mg Oral Q6H PRN Marshall Caruso MD      albuterol  1 puff Inhalation Q6H PRN Marshall Caruso MD      gabapentin  600 mg Oral HS Marshall Caruso MD      ondansetron  4 mg Intravenous Q4H PRN Marshall Caruso MD      pantoprazole  40 mg Intravenous Q12H [address] Marshall Caruso MD      sucralfate  1 g Oral Q6H [address] Marshall Caruso MD      tiZANidine  4 mg Oral BID Marshall Caruso MD          Today, Patient Was Seen By: Zakiya Ferrer MD    **Please Note: This note may have been constructed using a voice recognition system  **

## 2022-03-06 NOTE — CONSULTS
[facility] - Inpatient Consultation  Yarelis Skelton 31 y o  male MRN: [id number]  Unit/Bed#: [facility] 825-01 Encounter: [id number]    Reason for Consult / Principal Problem:     Melena, PUD    ASSESSMENT & PLAN:      81-year-old male with history of asthma and PUD, presented with coffee-ground emesis and melena after syncopal episode  GI consultation requested for same  1  Melena, hematemesis - no further episodes  Suspect secondary to underlying ulcer disease based on previous history  Denies current NSAID use which was suspected factor previously  Possibly underlying H pylori infection vs gastrinoma vs nicotine-induced (patient admits to vaping) vs malignancy although this is less likely  Hb is stable at baseline  · Plan for EGD tomorrow for evaluation  · Okay for non-ulcerogenic diet today  · NPO at midnight for procedure tomorrow  · Continue IV PPI Q12h  · Check H pylori workup as noted below  · Avoid NSAIDs  · Follow clinically  · Antiemetics and supportive care per primary team    2  PUD - history of peptic ulcer disease in the past  Similar presentations in the past as well, most recently in 12/2020 which was suspected to be NSAID-induced ulcer disease at that time  Not currently taking NSAIDs according to patient  · GI recommendations as noted above  · Check H pylori serologies and stool antigen  · Avoid NSAIDs  · Continue PPI Q12h    3  Syncope - unclear etiology  Uncertain if this is related to his GI symptoms as it does not appear he has had significant blood loss  Possibly an exaggerated vagal response? Although this would seem unusual  Possible underlying cardiac abnormality?   · Consider cardiac evaluation; will defer workup to primary team  · GI recommendations as noted above    ______________________________________________________________________    HISTORY OF PRESENT ILLNESS:  81-year-old male with history of asthma presented from home with complaints of episode of hematemesis and reported dark tarry stools  Onset yesterday morning after waking up  Patient reports had some nausea and was walking to kitchen  Next he remembers waking up on the floor, found by his roommate  Has had history of PUD in the past including episode after graduating highschool and more recently in 12/2020 when he presented with melena and drop in Hb  He did not get upper endoscopy at that time  He had some witnessed melena in the ED but Hb has remained stable at baseline  CT showed evidence of PUD in the proximal duodenum  No other significant acute GI findings  GI evaluation requested for melena and suspected bleeding ulcer  Patient denies any abdominal pain currently  Denies any recent NSAID use but was using NSAIDs previously when he presented back in 2020  Denies any family history of GI malignancy  Denies excessive alcohol use  Does admit to vaping  No other alarm symptoms        REVIEW OF SYSTEMS:    CONSTITUTIONAL: Denies any fever, chills, rigors, and weight loss  HEENT: No earache or tinnitus, denies hearing loss or visual disturbances  CARDIOVASCULAR: No chest pain or palpitations   RESPIRATORY: Denies any cough, hemoptysis, shortness of breath or dyspnea on exertion  GASTROINTESTINAL: As noted in the History of Present Illness   GENITOURINARY: No problems with urination, denies any hematuria or dysuria  NEUROLOGIC: No dizziness or vertigo, denies headaches   MUSCULOSKELETAL: Denies any muscle or joint pain   SKIN: Denies skin rashes or itching   ENDOCRINE: Denies excessive thirst, denies intolerance to heat or cold  PSYCHOSOCIAL: Denies depression or anxiety, denies any recent memory loss     Historical Information   Past Medical History:   Diagnosis Date    Asthma      Past Surgical History:   Procedure Laterality Date    FACIAL RECONSTRUCTION SURGERY      KNEE SURGERY      NECK SURGERY       Social History   Social History     Substance and Sexual Activity   Alcohol Use Not Currently Comment: occasional     Social History     Substance and Sexual Activity   Drug Use No     Social History     Tobacco Use   Smoking Status Former Smoker    Packs/day: 0 50    Types: Cigarettes   Smokeless Tobacco Never Used     History reviewed  No pertinent family history  Meds/Allergies   Medications Prior to Admission   Medication    albuterol (PROVENTIL HFA,VENTOLIN HFA) 90 mcg/act inhaler    gabapentin (NEURONTIN) 600 MG tablet    guaiFENesin (ROBITUSSIN) 100 MG/5ML oral liquid    ibuprofen (MOTRIN) 600 mg tablet    naproxen (NAPROSYN) 500 mg tablet    pantoprazole (PROTONIX) 20 mg tablet    sucralfate (CARAFATE) 1 g tablet    tapentadol (NUCYNTA) 50 mg tablet    tapentadol (NUCYNTA) tablet    tiZANidine (ZANAFLEX) 4 mg tablet     Current Facility-Administered Medications   Medication Dose Route Frequency    acetaminophen (TYLENOL) tablet 650 mg  650 mg Oral Q6H PRN    albuterol (PROVENTIL HFA,VENTOLIN HFA) inhaler 1 puff  1 puff Inhalation Q6H PRN    gabapentin (NEURONTIN) capsule 600 mg  600 mg Oral HS    ondansetron (ZOFRAN) injection 4 mg  4 mg Intravenous Q4H PRN    pantoprazole (PROTONIX) injection 40 mg  40 mg Intravenous Q12H KELL    sodium chloride 0 9 % infusion  125 mL/hr Intravenous Continuous    sucralfate (CARAFATE) tablet 1 g  1 g Oral Q6H KELL    tiZANidine (ZANAFLEX) tablet 4 mg  4 mg Oral BID     Allergies   Allergen Reactions    Augmentin [Amoxicillin-Pot Clavulanate] Rash       PHYSICAL EXAM:      Objective   Blood pressure 107/63, pulse 75, temperature 97 7 °F (36 5 °C), resp  rate 20, height 5' 7" (1 702 m), weight 81 6 kg (180 lb), SpO2 97 %  Body mass index is 28 19 kg/m²      Intake/Output Summary (Last 24 hours) at 3/6/2022 0799  Last data filed at 3/6/2022 0514  Gross per 24 hour   Intake 1962 5 ml   Output --   Net 1962 5 ml       General Appearance:   Alert, cooperative, no distress   HEENT:   Normocephalic, atraumatic, anicteric     Neck:   Supple, symmetrical, trachea midline   Lungs:   Equal chest rise, respirations unlabored    Heart:   Regular rate and rhythm   Abdomen:   Soft, non-tender, non-distended; normal bowel sounds; no masses, no organomegaly    Rectal:   Deferred    Extremities:   No cyanosis, clubbing or edema    Neuro:    Moves all 4 extremities    Skin:   No jaundice, rashes, or lesions      LAB RESULTS:     Admission on 03/05/2022   Component Date Value    WBC 03/05/2022 7 26     RBC 03/05/2022 4 38     Hemoglobin 03/05/2022 12 6     Hematocrit 03/05/2022 37 2     MCV 03/05/2022 85     MCH 03/05/2022 28 8     MCHC 03/05/2022 33 9     RDW 03/05/2022 12 4     MPV 03/05/2022 10 5     Platelets 51/96/0033 206     nRBC 03/05/2022 0     Neutrophils Relative 03/05/2022 70     Immat GRANS % 03/05/2022 0     Lymphocytes Relative 03/05/2022 21     Monocytes Relative 03/05/2022 7     Eosinophils Relative 03/05/2022 1     Basophils Relative 03/05/2022 1     Neutrophils Absolute 03/05/2022 5 10     Immature Grans Absolute 03/05/2022 0 02     Lymphocytes Absolute 03/05/2022 1 50     Monocytes Absolute 03/05/2022 0 50     Eosinophils Absolute 03/05/2022 0 10     Basophils Absolute 03/05/2022 0 04     Sodium 03/05/2022 142     Potassium 03/05/2022 4 1     Chloride 03/05/2022 112*    CO2 03/05/2022 27     ANION GAP 03/05/2022 3*    BUN 03/05/2022 26*    Creatinine 03/05/2022 0 84     Glucose 03/05/2022 98     Calcium 03/05/2022 7 8*    Corrected Calcium 03/05/2022 8 5     AST 03/05/2022 17     ALT 03/05/2022 24     Alkaline Phosphatase 03/05/2022 57     Total Protein 03/05/2022 6 0*    Albumin 03/05/2022 3 1*    Total Bilirubin 03/05/2022 0 87     eGFR 03/05/2022 114     ABO Grouping 03/05/2022 O     Rh Factor 03/05/2022 Positive     Antibody Screen 03/05/2022 Negative     Specimen Expiration Date 03/05/2022 [id number]     ABO Grouping 03/05/2022 O     Rh Factor 03/05/2022 Positive     Hemoglobin 03/05/2022 12 5     Hematocrit 03/05/2022 36 9     Hemoglobin 03/05/2022 13 2     Hematocrit 03/05/2022 40 2        RADIOLOGY RESULTS: I have personally reviewed pertinent imaging studies  WINSOME Samuel  Gastroenterology Fellow  [facility]  Available on [email or web address]

## 2022-03-06 NOTE — UTILIZATION REVIEW
Initial Clinical Review    Admission: Date/Time/Statement:   Admission Orders (From admission, onward)     Ordered        03/05/22 1303  Inpatient Admission  Once                      Orders Placed This Encounter   Procedures    Inpatient Admission     Standing Status:   Standing     Number of Occurrences:   1     Order Specific Question:   Level of Care     Answer:   Med Surg [16]     Order Specific Question:   Estimated length of stay     Answer:   More than 2 Midnights     Order Specific Question:   Certification     Answer:   I certify that inpatient services are medically necessary for this patient for a duration of greater than two midnights  See H&P and MD Progress Notes for additional information about the patient's course of treatment  ED Arrival Information     Expected Arrival Acuity    - 3/5/2022 08:13 Urgent         Means of arrival Escorted by Service Admission type    Ambulance [facility] Urgent         Arrival complaint    Vomiting        Chief Complaint   Patient presents with    Vomiting Blood     Patient woke up around  and went to the bathroom  Was vomiting blood and having diarrhea with blood in stool  Was found "passed out" on couch by roomate  Denies any fevers/chills/N/D/CP/SOB at this time   Diarrhea       Initial Presentation:   31 y o  male w/h/o GIB and PUD in setting of NSAID use,  To ER from home via EMS  Admitted IP status, MS level of care  For workup of reported melena and hematemesis  Reports hematemesis and dark tarry, diarrhea  stools following onset of nausea   earlier this morning  Reports vomiting with noticeable blood  acknowledges some mild left-sided abdominal discomfort  PMH  notable for a similar episode of GI bleed late last year which was attributed to suspected peptic ulcer disease for which he was on a PPI and Carafate regimen  He eventually stopped his PPI regimen due to resolution of symptoms over time      In the ED, lab work revealed an essentially normal hemoglobin level and CT imaging negative for acute/ongoing bleed  stated few episodes of dark tinged diarrhea post-hospital arrival  (not witnessed)  With continued cramping  Denies syncope   Continue IV PPI BID and Carafate - allow clear liquid diet w/ NPO after midnight for tentative scope  Cont IVF,   PRN pain/nausea control  Trend H&H     Date:  3/6        Day 2:  No witnessed/recorded hematemesis/melena since arrival      PER GI CONSULT:   Suspect melena, hematemesis 2/2 underlying PUD  Denies current NSAID use  Does admit to vaping  HGB stable at baseline  Will Plan for EGD Tomorrow, allow non ulcerogenic diet today  Cont IV PPI q 12,  NO NSAIDS  Check H pylori serologies and stool antigen            ED Triage Vitals [03/05/22 0817]   Temperature Pulse Respirations Blood Pressure SpO2   97 6 °F (36 4 °C) 76 18 106/56 99 %      Temp Source Heart Rate Source Patient Position - Orthostatic VS BP Location FiO2 (%)   Oral Monitor Lying Right arm --      Pain Score       No Pain          Wt Readings from Last 1 Encounters:   03/05/22 81 6 kg (180 lb)     Additional Vital Signs:  03/06/22 07:37:56 97 7 °F (36 5 °C) 75 20 107/63 78 97 % -- --   03/05/22 22:10:22 98 3 °F (36 8 °C) 92 16 108/62 77 100 % -- --   03/05/22 14:27:26 97 9 °F (36 6 °C) 109 Abnormal  16 126/89 101 99 %         Pertinent Labs/Diagnostic Test Results:   ekg none     CT high volume lower GI bleed   Final Result by Soledad Vazquez MD (03/05 1206)      No evidence of acute active GI bleeding  Acute proximal duodenitis likely peptic ulcer disease with adjacent fat stranding and reactive adenitis  Small ill-defined hypoattenuation of the anterolateral wall of the proximal duodenum suspicious for early ulceration  No free gas or bowel    obstruction  GI consultation is advised        Nonenhancing high density fluid throughout the colon without evidence of active GI bleeding may represent hematochezia and/or recent ingestion of high density meal or medication intermixed with diarrhea  Correlate with H&H           Results from last 7 days   Lab Units 03/06/22  0831 03/05/22  2352 03/05/22  1612 03/05/22  0824   WBC Thousand/uL  --   --   --  7 26   HEMOGLOBIN g/dL 11 1* 13 2 12 5 12 6   HEMATOCRIT % 32 9* 40 2 36 9 37 2   PLATELETS Thousands/uL  --   --   --  206   NEUTROS ABS Thousands/µL  --   --   --  5 10         Results from last 7 days   Lab Units 03/05/22  0824   SODIUM mmol/L 142   POTASSIUM mmol/L 4 1   CHLORIDE mmol/L 112*   CO2 mmol/L 27   ANION GAP mmol/L 3*   BUN mg/dL 26*   CREATININE mg/dL 0 84   EGFR ml/min/1 73sq m 114   CALCIUM mg/dL 7 8*     Results from last 7 days   Lab Units 03/05/22  0824   AST U/L 17   ALT U/L 24   ALK PHOS U/L 57   TOTAL PROTEIN g/dL 6 0*   ALBUMIN g/dL 3 1*   TOTAL BILIRUBIN mg/dL 0 87         Results from last 7 days   Lab Units 03/05/22  0824   GLUCOSE RANDOM mg/dL 98         ED Treatment:   Medication Administration from 03/05/2022 0813 to 03/05/2022 1313       Date/Time Order Dose Route Action     03/05/2022 0941 pantoprazole (PROTONIX) 80 mg in sodium chloride 0 9 % 100 mL IVPB 80 mg Intravenous New Bag     03/05/2022 1141 famotidine (PEPCID) injection 20 mg 20 mg Intravenous Given        Past Medical History:   Diagnosis Date    Asthma      Present on Admission:   Hematemesis      Admitting Diagnosis: Melena [K92 1]  Vomiting [R11 10]  GI bleed [K92 2]  Hematemesis with nausea [K92 0]  Hematemesis without nausea [K92 0]  Age/Sex: 31 y o  male  Admission Orders:  H&H q 8 hr;  NPO;  IP CONSULT TO GASTROENTEROLOGY    Scheduled Medications:  gabapentin, 600 mg, Oral, HS  pantoprazole, 40 mg, Intravenous, Q12H KELL  sucralfate, 1 g, Oral, Q6H KELL  tiZANidine, 4 mg, Oral, BID      Continuous IV Infusions:  sodium chloride, 125 mL/hr, Intravenous, Continuous      PRN Meds:  acetaminophen, 650 mg, Oral, Q6H PRN  albuterol, 1 puff, Inhalation, Q6H PRN  ondansetron, 4 mg, Intravenous, Q4H PRN        Network Utilization Review Department  ATTENTION: Please call with any questions or concerns to [phone] and carefully listen to the prompts so that you are directed to the right person  All voicemails are confidential   Margarita Alley all requests for admission clinical reviews, approved or denied determinations and any other requests to dedicated fax number below belonging to the campus where the patient is receiving treatment   List of dedicated fax numbers for the Facilities:  [address] DENIALS (Administrative/Medical Necessity) [phone]   [address] (Maternity/NICU/Pediatrics) [phone]   [address]  [address] [facility] [place] [address] [address] [place] [address] [address] [facility] [place] [address] [phone]

## 2022-03-07 ENCOUNTER — APPOINTMENT (INPATIENT)
Dept: GASTROENTEROLOGY | Facility: HOSPITAL | Age: 37
DRG: 379 | End: 2022-03-07

## 2022-03-07 ENCOUNTER — ANESTHESIA EVENT (INPATIENT)
Dept: GASTROENTEROLOGY | Facility: HOSPITAL | Age: 37
DRG: 379 | End: 2022-03-07

## 2022-03-07 ENCOUNTER — ANESTHESIA (INPATIENT)
Dept: GASTROENTEROLOGY | Facility: HOSPITAL | Age: 37
DRG: 379 | End: 2022-03-07

## 2022-03-07 VITALS
HEIGHT: 67 IN | WEIGHT: 180 LBS | DIASTOLIC BLOOD PRESSURE: 83 MMHG | BODY MASS INDEX: 28.25 KG/M2 | RESPIRATION RATE: 18 BRPM | SYSTOLIC BLOOD PRESSURE: 109 MMHG | OXYGEN SATURATION: 98 % | TEMPERATURE: 98.7 F | HEART RATE: 99 BPM

## 2022-03-07 LAB
ERYTHROCYTE [DISTWIDTH] IN BLOOD BY AUTOMATED COUNT: 12.5 % (ref 11.6–15.1)
HCT VFR BLD AUTO: 32.4 % (ref 36.5–49.3)
HGB BLD-MCNC: 10.9 G/DL (ref 12–17)
MCH RBC QN AUTO: 28.8 PG (ref 26.8–34.3)
MCHC RBC AUTO-ENTMCNC: 33.6 G/DL (ref 31.4–37.4)
MCV RBC AUTO: 86 FL (ref 82–98)
PLATELET # BLD AUTO: 218 THOUSANDS/UL (ref 149–390)
PMV BLD AUTO: 10.5 FL (ref 8.9–12.7)
RBC # BLD AUTO: 3.78 MILLION/UL (ref 3.88–5.62)
WBC # BLD AUTO: 5.21 THOUSAND/UL (ref 4.31–10.16)

## 2022-03-07 PROCEDURE — 43239 EGD BIOPSY SINGLE/MULTIPLE: CPT | Performed by: INTERNAL MEDICINE

## 2022-03-07 PROCEDURE — 0DB98ZZ EXCISION OF DUODENUM, VIA NATURAL OR ARTIFICIAL OPENING ENDOSCOPIC: ICD-10-PCS | Performed by: INTERNAL MEDICINE

## 2022-03-07 PROCEDURE — 86677 HELICOBACTER PYLORI ANTIBODY: CPT | Performed by: STUDENT IN AN ORGANIZED HEALTH CARE EDUCATION/TRAINING PROGRAM

## 2022-03-07 PROCEDURE — 85027 COMPLETE CBC AUTOMATED: CPT | Performed by: HOSPITALIST

## 2022-03-07 PROCEDURE — 0DB58ZZ EXCISION OF ESOPHAGUS, VIA NATURAL OR ARTIFICIAL OPENING ENDOSCOPIC: ICD-10-PCS | Performed by: INTERNAL MEDICINE

## 2022-03-07 PROCEDURE — 99239 HOSP IP/OBS DSCHRG MGMT >30: CPT | Performed by: HOSPITALIST

## 2022-03-07 PROCEDURE — 88305 TISSUE EXAM BY PATHOLOGIST: CPT | Performed by: PATHOLOGY

## 2022-03-07 PROCEDURE — 0DB68ZZ EXCISION OF STOMACH, VIA NATURAL OR ARTIFICIAL OPENING ENDOSCOPIC: ICD-10-PCS | Performed by: INTERNAL MEDICINE

## 2022-03-07 PROCEDURE — 87338 HPYLORI STOOL AG IA: CPT | Performed by: STUDENT IN AN ORGANIZED HEALTH CARE EDUCATION/TRAINING PROGRAM

## 2022-03-07 RX ORDER — PANTOPRAZOLE SODIUM 40 MG/1
40 TABLET, DELAYED RELEASE ORAL 2 TIMES DAILY
Qty: 60 TABLET | Refills: 0 | Status: SHIPPED | OUTPATIENT
Start: 2022-03-07 | End: 2022-03-07 | Stop reason: SDUPTHER

## 2022-03-07 RX ORDER — PROPOFOL 10 MG/ML
INJECTION, EMULSION INTRAVENOUS CONTINUOUS PRN
Status: DISCONTINUED | OUTPATIENT
Start: 2022-03-07 | End: 2022-03-07

## 2022-03-07 RX ORDER — SUCRALFATE 1 G/1
1 TABLET ORAL 4 TIMES DAILY
Qty: 120 TABLET | Refills: 0 | Status: SHIPPED | OUTPATIENT
Start: 2022-03-07 | End: 2022-04-06

## 2022-03-07 RX ORDER — SUCRALFATE 1 G/1
1 TABLET ORAL 4 TIMES DAILY
Qty: 40 TABLET | Refills: 0 | Status: SHIPPED | OUTPATIENT
Start: 2022-03-07 | End: 2022-03-07

## 2022-03-07 RX ORDER — SUCRALFATE 1 G/1
1 TABLET ORAL 4 TIMES DAILY
Qty: 120 TABLET | Refills: 0 | Status: SHIPPED | OUTPATIENT
Start: 2022-03-07 | End: 2022-03-07

## 2022-03-07 RX ORDER — SODIUM CHLORIDE 9 MG/ML
INJECTION, SOLUTION INTRAVENOUS CONTINUOUS PRN
Status: DISCONTINUED | OUTPATIENT
Start: 2022-03-07 | End: 2022-03-07

## 2022-03-07 RX ORDER — PANTOPRAZOLE SODIUM 40 MG/1
40 TABLET, DELAYED RELEASE ORAL 2 TIMES DAILY
Qty: 60 TABLET | Refills: 0 | Status: SHIPPED | OUTPATIENT
Start: 2022-03-07 | End: 2022-04-06

## 2022-03-07 RX ORDER — PROPOFOL 10 MG/ML
INJECTION, EMULSION INTRAVENOUS AS NEEDED
Status: DISCONTINUED | OUTPATIENT
Start: 2022-03-07 | End: 2022-03-07

## 2022-03-07 RX ORDER — SODIUM CHLORIDE 9 MG/ML
125 INJECTION, SOLUTION INTRAVENOUS CONTINUOUS
Status: CANCELLED | OUTPATIENT
Start: 2022-03-07

## 2022-03-07 RX ADMIN — SUCRALFATE 1 G: 1 TABLET ORAL at 05:40

## 2022-03-07 RX ADMIN — SODIUM CHLORIDE: 0.9 INJECTION, SOLUTION INTRAVENOUS at 10:57

## 2022-03-07 RX ADMIN — PROPOFOL 100 MG: 10 INJECTION, EMULSION INTRAVENOUS at 11:22

## 2022-03-07 RX ADMIN — PROPOFOL 50 MG: 10 INJECTION, EMULSION INTRAVENOUS at 11:24

## 2022-03-07 RX ADMIN — PROPOFOL 100 MCG/KG/MIN: 10 INJECTION, EMULSION INTRAVENOUS at 11:24

## 2022-03-07 RX ADMIN — SUCRALFATE 1 G: 1 TABLET ORAL at 12:48

## 2022-03-07 RX ADMIN — LIDOCAINE HYDROCHLORIDE 100 MG: 20 INJECTION INTRAVENOUS at 11:21

## 2022-03-07 NOTE — ANESTHESIA POSTPROCEDURE EVALUATION
Post-Op Assessment Note    CV Status:  Stable  Pain Score: 0    Pain management: adequate     Mental Status:  Alert and awake   Hydration Status:  Euvolemic   PONV Controlled:  Controlled   Airway Patency:  Patent      Post Op Vitals Reviewed: Yes      Staff: Anesthesiologist, CRNA         No complications documented      /74 (03/07/22 1142)    Temp 98 7 °F (37 1 °C) (03/07/22 1142)    Pulse (!) 120 (03/07/22 1142)   Resp 20 (03/07/22 1142)    SpO2 98 % (03/07/22 1142)

## 2022-03-07 NOTE — PLAN OF CARE
Problem: Potential for Falls  Goal: Patient will remain free of falls  Description: INTERVENTIONS:  - Educate patient/family on patient safety including physical limitations  - Instruct patient to call for assistance with activity   - Consult OT/PT to assist with strengthening/mobility   - Keep Call bell within reach  - Keep bed low and locked with side rails adjusted as appropriate  - Keep care items and personal belongings within reach  - Initiate and maintain comfort rounds  - Make Fall Risk Sign visible to staff  - Apply yellow socks and bracelet for high fall risk patients  - Consider moving patient to room near nurses station  Outcome: Progressing     Problem: GASTROINTESTINAL - ADULT  Goal: Minimal or absence of nausea and/or vomiting  Description: INTERVENTIONS:  - Administer IV fluids if ordered to ensure adequate hydration  - Maintain NPO status until nausea and vomiting are resolved  - Nasogastric tube if ordered  - Administer ordered antiemetic medications as needed  - Provide nonpharmacologic comfort measures as appropriate  - Advance diet as tolerated, if ordered  - Consider nutrition services referral to assist patient with adequate nutrition and appropriate food choices  Outcome: Progressing  Goal: Maintains or returns to baseline bowel function  Description: INTERVENTIONS:  - Assess bowel function  - Encourage oral fluids to ensure adequate hydration  - Administer IV fluids if ordered to ensure adequate hydration  - Administer ordered medications as needed  - Encourage mobilization and activity  - Consider nutritional services referral to assist patient with adequate nutrition and appropriate food choices  Outcome: Progressing  Goal: Maintains adequate nutritional intake  Description: INTERVENTIONS:  - Monitor percentage of each meal consumed  - Identify factors contributing to decreased intake, treat as appropriate  - Assist with meals as needed  - Monitor I&O, weight, and lab values if indicated  - Obtain nutrition services referral as needed  Outcome: Progressing  Goal: Oral mucous membranes remain intact  Description: INTERVENTIONS  - Assess oral mucosa and hygiene practices  - Implement preventative oral hygiene regimen  - Implement oral medicated treatments as ordered  - Initiate Nutrition services referral as needed  Outcome: Progressing     Problem: HEMATOLOGIC - ADULT  Goal: Maintains hematologic stability  Description: INTERVENTIONS  - Assess for signs and symptoms of bleeding or hemorrhage  - Monitor labs  - Administer supportive blood products/factors as ordered and appropriate  Outcome: Progressing     Problem: PAIN - ADULT  Goal: Verbalizes/displays adequate comfort level or baseline comfort level  Description: Interventions:  - Encourage patient to monitor pain and request assistance  - Assess pain using appropriate pain scale  - Administer analgesics based on type and severity of pain and evaluate response  - Implement non-pharmacological measures as appropriate and evaluate response  - Consider cultural and social influences on pain and pain management  - Notify physician/advanced practitioner if interventions unsuccessful or patient reports new pain  Outcome: Progressing     Problem: INFECTION - ADULT  Goal: Absence or prevention of progression during hospitalization  Description: INTERVENTIONS:  - Assess and monitor for signs and symptoms of infection  - Monitor lab/diagnostic results  - Monitor all insertion sites, i e  indwelling lines, tubes, and drains  - Monitor endotracheal if appropriate and nasal secretions for changes in amount and color  - [place] appropriate cooling/warming therapies per order  - Administer medications as ordered  - Instruct and encourage patient and family to use good hand hygiene technique  - Identify and instruct in appropriate isolation precautions for identified infection/condition  Outcome: Progressing  Goal: Absence of fever/infection during neutropenic period  Description: INTERVENTIONS:  - Monitor WBC    Outcome: Progressing     Problem: SAFETY ADULT  Goal: Patient will remain free of falls  Description: INTERVENTIONS:  - Educate patient/family on patient safety including physical limitations  - Instruct patient to call for assistance with activity   - Consult OT/PT to assist with strengthening/mobility   - Keep Call bell within reach  - Keep bed low and locked with side rails adjusted as appropriate  - Keep care items and personal belongings within reach  - Initiate and maintain comfort rounds  - Apply yellow socks and bracelet for high fall risk patients  - Consider moving patient to room near nurses station  Outcome: Progressing  Goal: Maintain or return to baseline ADL function  Description: INTERVENTIONS:  -  Assess patient's ability to carry out ADLs; assess patient's baseline for ADL function and identify physical deficits which impact ability to perform ADLs (bathing, care of mouth/teeth, toileting, grooming, dressing, etc )  - Assess/evaluate cause of self-care deficits   - Assess range of motion  - Assess patient's mobility; develop plan if impaired  - Assess patient's need for assistive devices and provide as appropriate  - Encourage maximum independence but intervene and supervise when necessary  - Involve family in performance of ADLs  - Assess for home care needs following discharge   - Consider OT consult to assist with ADL evaluation and planning for discharge  - Provide patient education as appropriate  Outcome: Progressing  Goal: Maintains/Returns to pre admission functional level  Description: INTERVENTIONS:  - Perform BMAT or MOVE assessment daily    - Set and communicate daily mobility goal to care team and patient/family/caregiver     - Collaborate with rehabilitation services on mobility goals if consulted  - Out of bed for toileting  - Record patient progress and toleration of activity level   Outcome: Progressing     Problem: DISCHARGE PLANNING  Goal: Discharge to home or other facility with appropriate resources  Description: INTERVENTIONS:  - Identify barriers to discharge w/patient and caregiver  - Arrange for needed discharge resources and transportation as appropriate  - Identify discharge learning needs (meds, wound care, etc )  - Arrange for interpretive services to assist at discharge as needed  - Refer to Case Management Department for coordinating discharge planning if the patient needs post-hospital services based on physician/advanced practitioner order or complex needs related to functional status, cognitive ability, or social support system  Outcome: Progressing     Problem: Knowledge Deficit  Goal: Patient/family/caregiver demonstrates understanding of disease process, treatment plan, medications, and discharge instructions  Description: Complete learning assessment and assess knowledge base    Interventions:  - Provide teaching at level of understanding  - Provide teaching via preferred learning methods  Outcome: Progressing

## 2022-03-07 NOTE — ANESTHESIA PREPROCEDURE EVALUATION
56-year-old with asthma, peptic ulcer disease who presents with coffee-ground emesis and melena  Hgb 10 9 today    Procedure:  EGD  anes wnl  Relevant Problems   GI/HEPATIC   (+) Hematemesis      PULMONARY   (+) Asthma        Physical Exam    Airway    Mallampati score: II  TM Distance: >3 FB  Neck ROM: full     Dental       Cardiovascular  Rate: normal,     Pulmonary  Pulmonary exam normal     Other Findings  Per pt denies anything remaining that is loose or removeable      Anesthesia Plan  ASA Score- 2     Anesthesia Type- IV sedation with anesthesia with ASA Monitors  Additional Monitors:   Airway Plan:     Comment: Per patient, appropriately NPO, denies active CP/SOB/wheezing/symptoms related to heartburn/nausea/vomiting  Plan Factors-Exercise tolerance (METS): >4 METS  Chart reviewed  Patient summary reviewed  Patient is not a current smoker  Induction- intravenous  Postoperative Plan-     Informed Consent- Anesthetic plan and risks discussed with patient  I personally reviewed this patient with the CRNA  Discussed and agreed on the Anesthesia Plan with the CRNA  Alexia Vazquez

## 2022-03-07 NOTE — CASE MANAGEMENT
Case Management Discharge Planning Note    Patient name Viola Anthony  Location [address] 825/PPHP 713-07 MRN [id number]  : 1985 Date 3/7/2022       Current Admission Date: 3/5/2022  Current Admission Diagnosis:Hematemesis   Patient Active Problem List    Diagnosis Date Noted    Asthma 2022    Hematemesis 2020    Melena 2020      LOS (days): 2  Geometric Mean LOS (GMLOS) (days):   Days to GMLOS:     OBJECTIVE:  Risk of Unplanned Readmission Score: 7         Current admission status: Inpatient   Preferred Pharmacy:   C[place]e [address] [facility] , [place] - [address]  [address] [facility] [place] [address]  Phone: [phone] Fax: [phone]    Elsie Cook, [address] [facility] [address]  Phone: [phone] Fax: Maribell Chicas [address] [facility], [address] [place] Haim Coelho [address] [place] [address]  Phone: [phone] Fax: [phone]    Primary Care Provider: Arin Hernandez MD    Primary Insurance:   Secondary Insurance:     DISCHARGE DETAILS:    Discharge planning discussed with[de-identified] patient  Freedom of Choice: Yes                   Contacts  Patient Contacts: mother Leonel [place]  Relationship to Patient[de-identified] Family  Contact Method: Phone  Phone Number: [phone]  Reason/Outcome: Continuity of [address]          Is the patient interested in [facility] at discharge?: No              Would you like to participate in our [address] service program?  : No - Declined     CM reviewed d/c planning process including the following: identifying help at home, patient preference for d/c planning needs, Discharge Lounge, [facility] Meds to Bed program, availability of treatment team to discuss questions or concerns patient and/or family may have regarding understanding medications and recognizing signs and symptoms once discharged  CM also encouraged patient to follow up with all recommended appointments after discharge  Patient advised of importance for patient and family to participate in managing patients medical well being  Patient/caregiver received discharge checklist  Content reviewed  Patient/caregiver encouraged to participate in discharge plan of care prior to discharge home

## 2022-03-07 NOTE — ASSESSMENT & PLAN NOTE
In the setting of progression of peptic ulcer disease and prior h/o extensive NSAID use (although he states he has not used NSAIDs for a while now) - similar episode late last year   He had stopped taking PPI once he felt better  CT imaging report noting: "No evidence of acute active GI bleeding  Acute proximal duodenitis likely peptic ulcer disease with adjacent fat stranding and reactive adenitis  Small ill-defined hypoattenuation of the anterolateral wall of the proximal duodenum suspicious for early ulceration  No free gas or bowel obstruction  GI consultation is advised  Nonenhancing high density fluid throughout the colon without evidence of active GI bleeding may represent hematochezia and/or recent ingestion of high density meal or medication intermixed with diarrhea  Correlate with H&H "  HB dropped to 11 1, no more vomit or dark stool  EGD on 3/7:   ·  C1M2 Wall's esophagus with 2 tongues and no associated nodule; performed cold forceps biopsy  · Small hiatal hernia - GE junction 37 cm from the incisors, diaphragmatic impression 39 cm from the incisors  · The stomach appeared normal  Biopsies taken to rule out H pylori  · Erythematous mucosa in the 1st part of the duodenum  · Single large diverticulum in the 2nd part of the duodenum  Doing well, hb stable at 10 9-11   No active bleed  Tolerating food hence cleared for DC home after clearance from GI    NO NSAIDs, continue PPI BID & carafate

## 2022-03-07 NOTE — DISCHARGE INSTR - AVS FIRST PAGE
DO NOT use advil/motrin/naproxen/ibuprofen    Make sure to take the protonix twice a day, along with carafate details… detailed exam

## 2022-03-08 LAB
H PYLORI AG STL QL IA: NEGATIVE
H PYLORI IGG SER IA-ACNC: 0.48 INDEX VALUE (ref 0–0.79)
H PYLORI IGM SER-ACNC: <9 UNITS (ref 0–8.9)

## 2022-12-08 ENCOUNTER — APPOINTMENT (EMERGENCY)
Dept: RADIOLOGY | Facility: HOSPITAL | Age: 37
End: 2022-12-08

## 2022-12-08 ENCOUNTER — HOSPITAL ENCOUNTER (EMERGENCY)
Facility: HOSPITAL | Age: 37
Discharge: HOME | End: 2022-12-08
Attending: EMERGENCY MEDICINE

## 2022-12-08 VITALS
HEART RATE: 88 BPM | RESPIRATION RATE: 20 BRPM | TEMPERATURE: 99 F | SYSTOLIC BLOOD PRESSURE: 122 MMHG | OXYGEN SATURATION: 97 % | DIASTOLIC BLOOD PRESSURE: 72 MMHG

## 2022-12-08 DIAGNOSIS — M25.531 RIGHT WRIST PAIN: Primary | ICD-10-CM

## 2022-12-08 DIAGNOSIS — M25.521 RIGHT ELBOW PAIN: ICD-10-CM

## 2022-12-08 RX ORDER — HYDROCODONE BITARTRATE AND ACETAMINOPHEN 5; 325 MG/1; MG/1
1 TABLET ORAL EVERY 6 HOURS PRN
Qty: 20 TABLET | Refills: 0 | Status: SHIPPED | OUTPATIENT
Start: 2022-12-08 | End: 2022-12-13

## 2022-12-08 RX ORDER — ACETAMINOPHEN 325 MG/1
975 TABLET ORAL ONCE
Status: COMPLETED | OUTPATIENT
Start: 2022-12-08 | End: 2022-12-08

## 2022-12-08 RX ADMIN — ACETAMINOPHEN 975 MG: 325 TABLET ORAL at 03:48

## 2022-12-08 NOTE — Clinical Note
Irving Nolan was seen and treated in our emergency department on 12/8/2022  Diagnosis:     [facility]  may return to work on return date  He may return on this date: 12/09/2022    Should not lift more than 15 pounds until seen and cleared by orthopedic surgery     If you have any questions or concerns, please don't hesitate to call        Irvin Mullins MD    ______________________________           _______________          _______________  Hospital Representative                              Date                                Time

## 2022-12-08 NOTE — DISCHARGE INSTRUCTIONS
Return to ED if any worsening symptoms  Take medications as prescribed  Rest, ice, elevate your arm  Follow up with orthopedics  Use arm brace during the daytime, can take it off at night  Should not lift anything more than 15 pounds

## 2022-12-08 NOTE — ED PROVIDER NOTES
History  Chief Complaint   Patient presents with   • Arm Injury     Pt states while he was at work a 2000 lb mixer almost fell on him, braced it with right arm approx 1 week ago  Thinks he twisted wrist and arm in process  Reportspain that radiates up past right elbow now     42-year-old male with history of Wall's esophagus, GI bleeding, asthma presenting to the ED due to right wrist and right elbow pain x1 week  Patient works at [email or web address], was carrying a 2000 pound mixer when the mixer started to fall, patient put his right hand in front of the mixer and hyperextended his wrist   Has had pain since  Pain is constant, sharp, radiating from the right thumb to the right elbow  Associated subjective numbness/tingling of the right thumb  Patient bought a wrist brace and has been wearing it since the incident  Has been taking Tylenol at home without relief of symptoms  Prior to Admission Medications   Prescriptions Last Dose Informant Patient Reported? Taking?    albuterol (PROVENTIL HFA,VENTOLIN HFA) 90 mcg/act inhaler   Yes No   Sig: Inhale 1 puff every 6 (six) hours as needed   guaiFENesin (ROBITUSSIN) 100 MG/5ML oral liquid   No No   Sig: Take 5-10 mL by mouth every 4 (four) hours as needed for cough   Patient not taking: Reported on 1/4/2020   pantoprazole (PROTONIX) 40 mg tablet   No No   Sig: Take 1 tablet (40 mg total) by mouth 2 (two) times a day   sucralfate (CARAFATE) 1 g tablet   No No   Sig: Take 1 tablet (1 g total) by mouth 4 (four) times a day   tapentadol (NUCYNTA) 50 mg tablet   Yes No   Sig: Take 50 mg by mouth as needed (instant relief)   tapentadol (NUCYNTA) tablet   Yes No   Sig: Take 100 mg by mouth 2 (two) times a day   tiZANidine (ZANAFLEX) 4 mg tablet   Yes No   Sig: Take by mouth 2 (two) times a day      Facility-Administered Medications: None       Past Medical History:   Diagnosis Date   • Acute GI bleeding    • Asthma    • Facial trauma     baseball bat injury when was 16 years old playing baseball       Past Surgical History:   Procedure Laterality Date   • EGD     • FACIAL RECONSTRUCTION SURGERY     • KNEE SURGERY     • NECK SURGERY         History reviewed  No pertinent family history  I have reviewed and agree with the history as documented  E-Cigarette/Vaping   • E-Cigarette Use Current Every Day User      E-Cigarette/Vaping Substances   • Nicotine Yes      Social History     Tobacco Use   • Smoking status: Former     Packs/day: 0 50     Types: Cigarettes   • Smokeless tobacco: Never   • Tobacco comments:     quit smoking cigaretyes almost a year ago   Vaping Use   • Vaping Use: Every day   • Substances: Nicotine   Substance Use Topics   • Alcohol use: Yes     Comment: occasional   • Drug use: No        Review of Systems   Constitutional: Negative for chills and fever  HENT: Negative for ear pain and sore throat  Eyes: Negative for pain and visual disturbance  Respiratory: Negative for cough and shortness of breath  Cardiovascular: Negative for chest pain and palpitations  Gastrointestinal: Negative for abdominal pain and vomiting  Genitourinary: Negative for dysuria and hematuria  Musculoskeletal: Negative for arthralgias and back pain  R elbow and R wrist pain  Decreased sensation of R thumb  R thumb swelling   Skin: Negative for color change and rash  Neurological: Negative for seizures and syncope  All other systems reviewed and are negative  Physical Exam  ED Triage Vitals [12/08/22 0251]   Temperature Pulse Respirations Blood Pressure SpO2   99 °F (37 2 °C) 88 20 122/72 97 %      Temp Source Heart Rate Source Patient Position - Orthostatic VS BP Location FiO2 (%)   Oral Monitor Sitting Right arm --      Pain Score       6             Orthostatic Vital Signs  Vitals:    12/08/22 0251   BP: 122/72   Pulse: 88   Patient Position - Orthostatic VS: Sitting       Physical Exam  Vitals and nursing note reviewed     Constitutional: General: He is not in acute distress  Appearance: He is well-developed  HENT:      Head: Normocephalic and atraumatic  Mouth/Throat:      Mouth: Mucous membranes are moist       Pharynx: Oropharynx is clear  Eyes:      Conjunctiva/sclera: Conjunctivae normal    Cardiovascular:      Rate and Rhythm: Normal rate and regular rhythm  Pulses: Normal pulses  Radial pulses are 2+ on the right side and 2+ on the left side  Dorsalis pedis pulses are 2+ on the right side and 2+ on the left side  Heart sounds: Normal heart sounds, S1 normal and S2 normal  No murmur heard  Pulmonary:      Effort: Pulmonary effort is normal  No respiratory distress  Breath sounds: Normal breath sounds and air entry  Abdominal:      Palpations: Abdomen is soft  Tenderness: There is no abdominal tenderness  Musculoskeletal:         General: No swelling  Right elbow: Normal       Right forearm: Normal       Right wrist: Normal       Right hand: Swelling and tenderness present  Cervical back: Neck supple  Right lower leg: No edema  Left lower leg: No edema  Comments: Normal ROM and strength of R shoulder, elbow and wrist    R hand: swelling, tenderness, subjective decreased sensation on palmar surface of thumb  Normal fingers flexion, extension, abduction, adduction  Normal 'ok' sign  Capillary refill < 2 seconds  Radial pulses 2+ bilaterally  Skin:     General: Skin is warm and dry  Capillary Refill: Capillary refill takes less than 2 seconds  Neurological:      Mental Status: He is alert     Psychiatric:         Mood and Affect: Mood normal          ED Medications  Medications   acetaminophen (TYLENOL) tablet 975 mg (975 mg Oral Given 12/8/22 9920)       Diagnostic Studies  Results Reviewed     None                 XR elbow 3+ views RIGHT   ED Interpretation by Ihor Oppenheim, MD (12/08 0957)   No acute fractures or dislocations      XR hand 3+ views RIGHT ED Interpretation by Lexx Arriola MD (12/08 1294)   No acute fractures or dislocations      XR wrist 3+ views RIGHT   ED Interpretation by Lexx Arriola MD (12/08 7750)   No acute fractures or dislocations            Procedures  Procedures      ED Course                                       MDM  Number of Diagnoses or Management Options  Right elbow pain  Right wrist pain  Diagnosis management comments: 79-year-old male presenting to the ED with right wrist and right elbow pain x1 week after trying to catch a falling 2000 pound mixer at work  X-ray right elbow, right hand, right wrist revealed no fractures or dislocations  Patient was given Tylenol 975 mg for pain relief in the ED  Will discharge home with Ortho follow-up, prescription for Norco considering patient's hx of GI bleeding  Pt given new wrist brace in ED  Strict return precautions       Amount and/or Complexity of Data Reviewed  Tests in the radiology section of CPT®: ordered and reviewed  Tests in the medicine section of CPT®: ordered and reviewed    Risk of Complications, Morbidity, and/or Mortality  Presenting problems: low  Diagnostic procedures: low  Management options: low    Patient Progress  Patient progress: stable      Disposition  Final diagnoses:   Right wrist pain   Right elbow pain     Time reflects when diagnosis was documented in both MDM as applicable and the Disposition within this note     Time User Action Codes Description Comment    12/8/2022  3:39 AM Phan Concepcionam Add [M25 531] Right wrist pain     12/8/2022  3:39 AM Phan Concepcionam Add [M25 521] Right elbow pain       ED Disposition     ED Disposition   Discharge    Condition   Stable    Date/Time   Thu Dec 8, 2022  3:38 AM    Comment   Jessy Valadez discharge to home/self care                 Follow-up Information     Follow up With Specialties Details Why Contact Info Additional [address] [facility] Schedule an appointment as soon as possible for a visit today for follow up of your pain [address] [place] [address] [address] [facility] [place], [address], [address] [place], [place], [address] [phone]          Patient's Medications   Discharge Prescriptions    HYDROCODONE-ACETAMINOPHEN (NORCO) 5-325 MG PER TABLET    Take 1 tablet by mouth every 6 (six) hours as needed for pain for up to 5 days Max Daily Amount: 4 tablets       Start Date: 12/8/2022 End Date: 12/13/2022       Order Dose: 1 tablet       Quantity: 20 tablet    Refills: 0         PDMP Review       Value Time User    PDMP Reviewed  Yes 12/8/2022  2:55 AM Tracy Davey MD           ED Provider  Attending physically available and evaluated Juris Credit  I managed the patient along with the ED Attending      Electronically Signed by         Raquel Timmons MD  12/08/22 6897

## 2022-12-08 NOTE — ED ATTENDING ATTESTATION
12/8/2022  I, Duran Diana MD, saw and evaluated the patient  I have discussed the patient with the resident/non-physician practitioner and agree with the resident's/non-physician practitioner's findings, Plan of Care, and MDM as documented in the resident's/non-physician practitioner's note, except where noted  All available labs and Radiology studies were reviewed  I was present for key portions of any procedure(s) performed by the resident/non-physician practitioner and I was immediately available to provide assistance  At this point I agree with the current assessment done in the Emergency Department  I have conducted an independent evaluation of this patient a history and physical is as follows: Patient is a 30year old male who injured his R hand and wrist and elbow while trying to keep a 2000 pound mixer from falling at work about 1 week ago  RHD  Tried tylenol without relief  States he drove here  Was last seen at [facility] ED on 3/5/22 for hematemesis  [facility] website checked on this patient and last Rx filled was on 9/16/21 for vicodin for 2 day supply  Has h/o Wall's esophagus  (+) R palmar thenar eminence tenderness and volar wrist tenderness and R elbow tenderness  NVI  Rest of R UE nontender  No significant swelling  Differential diagnosis including but not limited to: sprain, strain, fracture, dislocation, contusion; doubt compartment syndrome  Will check x-rays       ED Course         Critical Care Time  Procedures

## 2022-12-12 ENCOUNTER — OFFICE VISIT (OUTPATIENT)
Dept: OBGYN CLINIC | Facility: CLINIC | Age: 37
End: 2022-12-12

## 2022-12-12 VITALS
HEART RATE: 82 BPM | SYSTOLIC BLOOD PRESSURE: 129 MMHG | WEIGHT: 226 LBS | BODY MASS INDEX: 35.4 KG/M2 | DIASTOLIC BLOOD PRESSURE: 85 MMHG

## 2022-12-12 DIAGNOSIS — M25.521 RIGHT ELBOW PAIN: ICD-10-CM

## 2022-12-12 DIAGNOSIS — S60.221A CONTUSION OF RIGHT HAND, INITIAL ENCOUNTER: Primary | ICD-10-CM

## 2022-12-12 DIAGNOSIS — M25.531 RIGHT WRIST PAIN: ICD-10-CM

## 2022-12-12 NOTE — PROGRESS NOTES
Orthopedic Sports Medicine New Patient Visit     Assesment:   30 y o  male with contusion of the right hand    Plan:    There is mild diffuse tenderness that has been slowly improving  No snuff box or scaphoid tubercle tenderness  Most tender at thenar  Thenar soft tissues  Discussed with patient that he would benefit from anti-inflammatory medications  As he cannot take NSAIDs, will rx topical voltaren gel  Discussed that he should continue with bracing as needed  Referral placed for hand therapy  Discussed with patient that he would benefit from following up with a hand surgeon for continued pain and limitations due to hand contusion if the pain continues in the next 2 weeks after starting hand therapy  Referral provided  Follow up with me as needed  Chief Complaint   Patient presents with   • Right Hand - Pain, Swelling   • Right Arm - Pain   • Right Elbow - Pain       History of Present Illness: The patient is a 30 y o  male presenting to the office with right upper extremity pain  Patient states that he was working about 2 weeks ago and braced a 2000lb mixer from falling with a twisting injury  He states that he has shooting pain in the arm from the thumb to the elbow  He has soreness in the elbow 6-7/10  He has been taking pain medications (Norco) with some improvement in symptoms  He has been wearing a wrist brace during the day and a thumb spica brace at night  He ices his arm as well  Patient works 60+ hours a week at [employer]       Past Medical, Social and Family History:  Past Medical History:   Diagnosis Date   • Acute GI bleeding    • Asthma    • Facial trauma     baseball bat injury when was 17years old playing baseball     Past Surgical History:   Procedure Laterality Date   • EGD     • FACIAL RECONSTRUCTION SURGERY     • KNEE SURGERY     • NECK SURGERY       Allergies   Allergen Reactions   • Augmentin [Amoxicillin-Pot Clavulanate] Rash     Current Outpatient Medications on File Prior to Visit   Medication Sig Dispense Refill   • albuterol (PROVENTIL HFA,VENTOLIN HFA) 90 mcg/act inhaler Inhale 1 puff every 6 (six) hours as needed     • HYDROcodone-acetaminophen (NORCO) 5-325 mg per tablet Take 1 tablet by mouth every 6 (six) hours as needed for pain for up to 5 days Max Daily Amount: 4 tablets 20 tablet 0   • pantoprazole (PROTONIX) 40 mg tablet Take 1 tablet (40 mg total) by mouth 2 (two) times a day 60 tablet 0   • sucralfate (CARAFATE) 1 g tablet Take 1 tablet (1 g total) by mouth 4 (four) times a day 120 tablet 0   • [DISCONTINUED] guaiFENesin (ROBITUSSIN) 100 MG/5ML oral liquid Take 5-10 mL by mouth every 4 (four) hours as needed for cough (Patient not taking: Reported on 1/4/2020) 60 mL 0   • [DISCONTINUED] tapentadol (NUCYNTA) 50 mg tablet Take 50 mg by mouth as needed (instant relief)     • [DISCONTINUED] tapentadol (NUCYNTA) tablet Take 100 mg by mouth 2 (two) times a day     • [DISCONTINUED] tiZANidine (ZANAFLEX) 4 mg tablet Take by mouth 2 (two) times a day       No current facility-administered medications on file prior to visit       Social History     Socioeconomic History   • Marital status: Single     Spouse name: Not on file   • Number of children: Not on file   • Years of education: Not on file   • Highest education level: Not on file   Occupational History   • Not on file   Tobacco Use   • Smoking status: Former     Packs/day: 0 50     Types: Cigarettes   • Smokeless tobacco: Never   • Tobacco comments:     quit smoking cigaretyes almost a year ago   Vaping Use   • Vaping Use: Every day   • Substances: Nicotine   Substance and Sexual Activity   • Alcohol use: Yes     Comment: occasional   • Drug use: No   • Sexual activity: Not on file   Other Topics Concern   • Not on file   Social History Narrative   • Not on file     Social Determinants of Health     Financial Resource Strain: Not on file   Food Insecurity: No Food Insecurity   • Worried About Running Out of Food in the Last Year: Never true   • Ran Out of Food in the Last Year: Never true   Transportation Needs: No Transportation Needs   • Lack of Transportation (Medical): No   • Lack of Transportation (Non-Medical): No   Physical Activity: Not on file   Stress: Not on file   Social Connections: Not on file   Intimate Partner Violence: Not on file   Housing Stability: Unknown   • Unable to Pay for Housing in the Last Year: No   • Number of Places Lived in the Last Year: Not on file   • Unstable Housing in the Last Year: No         I have reviewed the past medical, surgical, social and family history, medications and allergies as documented in the EMR  Review of systems: ROS is negative other than that noted in the HPI  Constitutional: Negative for fatigue and fever  HENT: Negative for sore throat  Respiratory: Negative for shortness of breath  Cardiovascular: Negative for chest pain  Gastrointestinal: Negative for abdominal pain  Endocrine: Negative for cold intolerance and heat intolerance  Genitourinary: Negative for flank pain  Musculoskeletal: Negative for back pain  Skin: Negative for rash  Allergic/Immunologic: Negative for immunocompromised state  Neurological: Negative for dizziness  Psychiatric/Behavioral: Negative for agitation  Physical Exam:    Blood pressure 129/85, pulse 82, weight 103 kg (226 lb)  General/Constitutional: NAD, well developed, well nourished  HENT: Normocephalic, atraumatic  CV: Intact distal pulses, regular rate  Resp: No respiratory distress or labored breathing  Abdomen: soft, nondistended, non tender   Lymphatic: No lymphadenopathy palpated  Neuro: Alert and Oriented x 3, no focal deficits  Psych: Normal mood, normal affect  Skin: Warm, dry, no rashes, no erythema    Right upper extremity exam:    Significant tenderness over thenar musculature  Patient has full ROM at the elbow, wrist and hand   Capable of making a full fist  Thumb opposition intact to base of small finger with pain  Pain with thumb circumduction  Positive finklestine test, positive sathish test  Pain with resisted wrist flexion and extension  No snuff box tenderness  Mild pain with W[place]ons but no clunk, and not as bad as pain in thenar muscles  Stable RCL and UCL of the thumb  Imaging    I reviewed and interpreted X-rays of the right hand, right wrist, and right elbow which show no acute fractures  No significant degenerative changes   There is no widening seen at the SL interval

## 2022-12-28 ENCOUNTER — OFFICE VISIT (OUTPATIENT)
Dept: URGENT CARE | Age: 37
End: 2022-12-28

## 2022-12-28 VITALS
HEART RATE: 84 BPM | RESPIRATION RATE: 18 BRPM | TEMPERATURE: 98.1 F | OXYGEN SATURATION: 99 % | DIASTOLIC BLOOD PRESSURE: 80 MMHG | SYSTOLIC BLOOD PRESSURE: 146 MMHG

## 2022-12-28 DIAGNOSIS — R68.83 CHILLS: ICD-10-CM

## 2022-12-28 DIAGNOSIS — K52.9 GASTROENTERITIS: Primary | ICD-10-CM

## 2022-12-28 DIAGNOSIS — R09.81 NASAL CONGESTION: ICD-10-CM

## 2022-12-28 NOTE — LETTER
December 28, 2022     Patient: Sreedhar Crane   YOB: 1985   Date of Visit: 12/28/2022       To Whom It May Concern:    Please excuse [place] from work due to illness  Patient may return to work on 12/29/2022             Sincerely,        Андрей Hammonds PA-C

## 2022-12-28 NOTE — PROGRESS NOTES
[facility]        NAME: Corrie Thompson is a 30 y o  male  : 1985    MRN: [id number]  DATE: 2022  TIME: 1:17 PM    Assessment and Plan   Gastroenteritis [K52 9]  1  Gastroenteritis        2  Nasal congestion  Cov/Flu-Collected at [facility]      3  Chills  Cov/Flu-Collected at [facility]            Patient Instructions       Follow up with PCP in 3-5 days  Proceed to  ER if symptoms worsen  Chief Complaint     Chief Complaint   Patient presents with   • Vomiting   • Diarrhea   • low body temp     Patient been having vomiting and diarrhea for the last 3 days and low body temp- no vomiting today and diarrhea x2 today         History of Present Illness       Patient in for evaluation of nausea, vomiting, diarrhea which started on Monday  Patient states that his temperature was low running around 95 1-96 over the last couple days  Patient last episode of vomiting was Monday  Still having multiple episodes of diarrhea throughout the day but less so today  Patient still has some slight nausea but no vomiting  He is able to keep down fluids  He is drinking Gatorade which is helping some  Is also been having some congestion and chills  Review of Systems   Review of Systems   Constitutional: Positive for chills and fatigue  Negative for activity change, appetite change and diaphoresis  HENT: Positive for congestion  Negative for ear discharge, ear pain, postnasal drip, rhinorrhea, sinus pressure, sinus pain, sore throat and trouble swallowing  Eyes: Negative  Respiratory: Negative  Cardiovascular: Negative  Gastrointestinal: Positive for diarrhea, nausea and vomiting  Negative for abdominal pain and constipation  Neurological: Positive for light-headedness (Patient did have some episodes of feeling lightheaded after he had been vomiting and having diarrhea  )   Negative for dizziness, tremors, seizures, syncope, facial asymmetry, speech difficulty, weakness, numbness and headaches  Current Medications       Current Outpatient Medications:   •  albuterol (PROVENTIL HFA,VENTOLIN HFA) 90 mcg/act inhaler, Inhale 1 puff every 6 (six) hours as needed, Disp: , Rfl:   •  Diclofenac Sodium (VOLTAREN) 1 %, Apply 2 g topically 4 (four) times a day, Disp: 100 g, Rfl: 0  •  pantoprazole (PROTONIX) 40 mg tablet, Take 1 tablet (40 mg total) by mouth 2 (two) times a day, Disp: 60 tablet, Rfl: 0  •  sucralfate (CARAFATE) 1 g tablet, Take 1 tablet (1 g total) by mouth 4 (four) times a day, Disp: 120 tablet, Rfl: 0    Current Allergies     Allergies as of 12/28/2022 - Reviewed 12/28/2022   Allergen Reaction Noted   • Augmentin [amoxicillin-pot clavulanate] Rash 05/29/2017            The following portions of the patient's history were reviewed and updated as appropriate: allergies, current medications, past family history, past medical history, past social history, past surgical history and problem list      Past Medical History:   Diagnosis Date   • Acute GI bleeding    • Asthma    • Facial trauma     baseball bat injury when was 17years old playing baseball       Past Surgical History:   Procedure Laterality Date   • EGD     • FACIAL RECONSTRUCTION SURGERY     • KNEE SURGERY     • NECK SURGERY         No family history on file  Medications have been verified  Objective   /80 (BP Location: Right arm, Patient Position: Sitting, Cuff Size: Standard)   Pulse 84   Temp 98 1 °F (36 7 °C)   Resp 18   SpO2 99%   No LMP for male patient  Physical Exam     Physical Exam  Vitals and nursing note reviewed  Constitutional:       General: He is not in acute distress  Appearance: Normal appearance  He is well-developed  He is not ill-appearing, toxic-appearing or diaphoretic  HENT:      Head: Normocephalic and atraumatic        Right Ear: Tympanic membrane and ear canal normal       Left Ear: Tympanic membrane and ear canal normal  Nose: Congestion present  No rhinorrhea  Mouth/Throat:      Mouth: Mucous membranes are moist       Pharynx: No oropharyngeal exudate or posterior oropharyngeal erythema  Eyes:      General:         Right eye: No discharge  Left eye: No discharge  Extraocular Movements: Extraocular movements intact  Conjunctiva/sclera: Conjunctivae normal       Pupils: Pupils are equal, round, and reactive to light  Cardiovascular:      Rate and Rhythm: Normal rate and regular rhythm  Heart sounds: Normal heart sounds  No murmur heard  Pulmonary:      Effort: Pulmonary effort is normal  No respiratory distress  Breath sounds: Normal breath sounds  No stridor  No wheezing, rhonchi or rales  Abdominal:      General: Abdomen is flat  Bowel sounds are normal  There is no distension  Tenderness: There is abdominal tenderness (Mild diffuse)  There is no right CVA tenderness, left CVA tenderness, guarding or rebound  Musculoskeletal:      Right lower leg: No edema  Left lower leg: No edema  Skin:     General: Skin is warm and dry  Findings: No rash  Neurological:      General: No focal deficit present  Mental Status: He is alert and oriented to person, place, and time  Sensory: No sensory deficit  Motor: No weakness  Gait: Gait normal    Psychiatric:         Mood and Affect: Mood normal          Behavior: Behavior normal          Thought Content:  Thought content normal          Judgment: Judgment normal

## 2022-12-29 LAB
FLUAV RNA RESP QL NAA+PROBE: NEGATIVE
FLUBV RNA RESP QL NAA+PROBE: NEGATIVE
SARS-COV-2 RNA RESP QL NAA+PROBE: NEGATIVE

## 2023-01-04 ENCOUNTER — OFFICE VISIT (OUTPATIENT)
Dept: OBGYN CLINIC | Facility: CLINIC | Age: 38
End: 2023-01-04

## 2023-01-04 VITALS
BODY MASS INDEX: 35.47 KG/M2 | DIASTOLIC BLOOD PRESSURE: 72 MMHG | WEIGHT: 226 LBS | HEIGHT: 67 IN | HEART RATE: 80 BPM | SYSTOLIC BLOOD PRESSURE: 130 MMHG

## 2023-01-04 DIAGNOSIS — S60.221A CONTUSION OF RIGHT HAND, INITIAL ENCOUNTER: ICD-10-CM

## 2023-01-04 RX ORDER — MELOXICAM 15 MG/1
15 TABLET ORAL DAILY
Qty: 14 TABLET | Refills: 0 | Status: SHIPPED | OUTPATIENT
Start: 2023-01-04

## 2023-01-04 NOTE — PROGRESS NOTES
Assessment:    Right hand contusion with resultant tendonitis      Plan:    Rest as able with activities to tolerance  Recommend starting formal OT/Hand therapy - referral in place  Rx Mobic provided  Continue nighttime splinting  Work note provided  Follow-up 4 weeks for re-evaluation        Visit Diagnoses     Contusion of right hand, initial encounter                       Subjective:     HPI    Patient ID:  Bhavana Fenton is a 30 y o  male presenting for evaluation of the right hand and wrist   He has been referred by Dr Hari Umana  According to the patient, about 1 month ago while at work he was bracing a heavy mixer from falling on an employee and felt immediate pain well localized to the volar and dorsal hand and wrist as well as the right elbow  X-rays were done showing no acute fractures and he was diagnosed with a right hand contusion  He was referred here for further evaluation  Today, the patient states his pain is well localized to the volar radial wrist with radiation to the posterior forearm region  No associated numbness and tingling that is constant  He states he has used a brace at night with some success  Overall he feels slight improvement in symptoms since last visit  He has not had a chance to get to formal therapy as of yet          The following portions of the patient's history were reviewed and updated as appropriate: allergies, current medications, past family history, past medical history, past social history, past surgical history, and problem list     Review of Systems     Objective:    Imaging:  Right hand x-rays 12/8/2022  VIEWS:  XR HAND 3+ VW RIGHT         For the purposes of institution wide universal language the following terms will apply: (thumb=1st digit/finger, index finger=2nd digit/finger, long finger=3rd digit/finger, ring=4th digit/finger and small finger=5th digit/finger)     FINDINGS:     There is no acute fracture or dislocation      No significant degenerative changes      No lytic or blastic osseous lesion      Soft tissues are unremarkable      IMPRESSION:     No acute osseous abnormality  Right wrist x-rays 12/8/2022  VIEWS:  XR WRIST 3+ VW RIGHT         FINDINGS:     There is no acute fracture or dislocation      No significant degenerative changes      No lytic or blastic osseous lesion      Soft tissues are unremarkable      IMPRESSION:     No acute osseous abnormality      Physical Exam     Vitals:    01/04/23 1044   BP: 130/72   Pulse: 80       General appearance:  NAD   Cardiac:  Regular rate  Lungs:  Unlabored breathing  Abdomen:  Non-distended    Orthopedic Examination:  Right hand     Inspection: No open wounds or erythema  No swelling  No ecchymosis  No obvious visual deformity  Palpation: + Tenderness to palpation first dorsal extensor compartment, FCR, MCP, IP joint of thumb    Nontender to palpation anatomic snuffbox, scaphoid tubercle    Range-of-motion: Normal wrist range of motion, full composite fist formation    Strength: 5/5 wrist flexion extension, thumb flexion extension,     Sensation: Intact median radial ulnar nerve distribution    Special Tests: No significant laxity with UCL/RCL stress

## 2023-01-04 NOTE — LETTER
January 4, 2023     Patient: Rosalee Mesa  YOB: 1985  Date of Visit: 1/4/2023      To Whom it May Concern:    Marguerite Ward is under my professional care  Kristine Scanlon was seen in my office on 1/4/2023  Kristinebony Scanlon is able to work with restrictions  Restrictions include no lifting greater than 15 lbs with right upper extremity  Restrictions to be in place until next visit in 1 month  If you have any questions or concerns, please don't hesitate to call           Sincerely,          Jenna Leija MD        CC: No Recipients

## 2023-02-01 ENCOUNTER — OFFICE VISIT (OUTPATIENT)
Dept: OBGYN CLINIC | Facility: CLINIC | Age: 38
End: 2023-02-01

## 2023-02-01 VITALS
WEIGHT: 226 LBS | DIASTOLIC BLOOD PRESSURE: 86 MMHG | SYSTOLIC BLOOD PRESSURE: 126 MMHG | HEART RATE: 77 BPM | HEIGHT: 67 IN | BODY MASS INDEX: 35.47 KG/M2

## 2023-02-01 DIAGNOSIS — M77.8 RIGHT WRIST TENDONITIS: Primary | ICD-10-CM

## 2023-02-01 RX ORDER — LIDOCAINE HYDROCHLORIDE 10 MG/ML
2 INJECTION, SOLUTION INFILTRATION; PERINEURAL
Status: COMPLETED | OUTPATIENT
Start: 2023-02-01 | End: 2023-02-01

## 2023-02-01 RX ORDER — BETAMETHASONE SODIUM PHOSPHATE AND BETAMETHASONE ACETATE 3; 3 MG/ML; MG/ML
3 INJECTION, SUSPENSION INTRA-ARTICULAR; INTRALESIONAL; INTRAMUSCULAR; SOFT TISSUE
Status: COMPLETED | OUTPATIENT
Start: 2023-02-01 | End: 2023-02-01

## 2023-02-01 RX ADMIN — LIDOCAINE HYDROCHLORIDE 2 ML: 10 INJECTION, SOLUTION INFILTRATION; PERINEURAL at 11:31

## 2023-02-01 RX ADMIN — BETAMETHASONE SODIUM PHOSPHATE AND BETAMETHASONE ACETATE 3 MG: 3; 3 INJECTION, SUSPENSION INTRA-ARTICULAR; INTRALESIONAL; INTRAMUSCULAR; SOFT TISSUE at 11:31

## 2023-02-01 NOTE — PROGRESS NOTES
Assessment:    Right hand contusion   Right FCR tendonitis       Plan:    Steroid injection provided to the right FCR tendon sheath today, and he tolerated well  Activities as tolerated  Over-the-counter NSAID medications as needed  Work note provided  Follow-up 6 weeks for re-evaluation      Visit Diagnoses     Contusion of right hand, initial encounter                       Subjective:     HPI    Patient ID:  Zina Goss is a 30 y o  male presenting for follow-up evaluation of the right hand/wrist   Today he states the bracing and oral pain medication did not help much  His pain is still located volar wrist/hand area without radiation  No numbness/tingling into the fingers  No new symptoms  The following portions of the patient's history were reviewed and updated as appropriate: allergies, current medications, past family history, past medical history, past social history, past surgical history, and problem list     Review of Systems     Objective:    Imaging:  Right hand x-rays 12/8/2022  VIEWS:  XR HAND 3+ VW RIGHT         For the purposes of institution wide universal language the following terms will apply: (thumb=1st digit/finger, index finger=2nd digit/finger, long finger=3rd digit/finger, ring=4th digit/finger and small finger=5th digit/finger)     FINDINGS:     There is no acute fracture or dislocation      No significant degenerative changes      No lytic or blastic osseous lesion      Soft tissues are unremarkable      IMPRESSION:     No acute osseous abnormality      Right wrist x-rays 12/8/2022  VIEWS:  XR WRIST 3+ VW RIGHT         FINDINGS:     There is no acute fracture or dislocation      No significant degenerative changes      No lytic or blastic osseous lesion      Soft tissues are unremarkable      IMPRESSION:     No acute osseous abnormality      Physical Exam     Vitals:    02/01/23 1105   BP: 126/86   Pulse: 77       General appearance:  NAD   Cardiac:  Regular rate  Lungs: Unlabored breathing  Abdomen:  Non-distended    Orthopedic Examination:  Right hand     Inspection: No open wounds or erythema  No swelling  No ecchymosis  No obvious visual deformity  Palpation: + Tenderness to palpation FCR, and thenar muscle area  Nontender to palpation anatomic snuffbox, scaphoid tubercle, first dorsal extensor compartment     Range-of-motion: Normal wrist range of motion, full composite fist formation    Strength: 5/5 wrist flexion extension, thumb flexion extension,     Sensation: Intact median radial ulnar nerve distribution    Special Tests: No significant laxity with UCL/RCL stress  Negative Finkelstein's     Hand/upper extremity injection: R wrist  Universal Protocol:  Consent: Verbal consent obtained    Risks and benefits: risks, benefits and alternatives were discussed  Consent given by: patient  Patient identity confirmed: verbally with patient    Supporting Documentation  Indications: tendon swelling and pain   Procedure Details  Condition:tendonitis Condition comment: R FCR Tendonitis    Site: R wrist   Preparation: Patient was prepped and draped in the usual sterile fashion  Needle size: 25 G  Medications administered: 2 mL lidocaine 1 %; 3 mg betamethasone acetate-betamethasone sodium phosphate 6 (3-3) mg/mL    Patient tolerance: patient tolerated the procedure well with no immediate complications  Dressing:  Sterile dressing applied

## 2023-02-01 NOTE — LETTER
February 1, 2023     Patient: Corrie Doctor  YOB: 1985  Date of Visit: 2/1/2023      To Whom it May Concern:     Kim Vasquez is under my professional care  Alycia Rain was seen in my office on 2/1/2023  Alycia Rain is able to work with restrictions  Restrictions include no lifting greater than 15 lbs with right upper extremity    Restrictions to be in place until next visit in 6 weeks         If you have any questions or concerns, please don't hesitate to call            Sincerely,            Fatoumata Leigh MD             CC: No Recipients

## 2023-02-08 ENCOUNTER — TELEPHONE (OUTPATIENT)
Dept: OBGYN CLINIC | Facility: MEDICAL CENTER | Age: 38
End: 2023-02-08

## 2023-02-08 NOTE — TELEPHONE ENCOUNTER
Caller: Stephanie Jimenez at [facility]    Doctor: India Hale    Reason for call:     Stephanie Jimenez is faxing over a medical request for medical records for this patient, please watch for her request     Call back#: n/a

## 2023-02-09 NOTE — TELEPHONE ENCOUNTER
Caller:  Ravin Borjas    Doctor: Guillermo Salguero    Reason for call:    Fax number [phone]    Call back#: n/a

## 2023-02-09 NOTE — TELEPHONE ENCOUNTER
Yadi Centeno,   Do you know by any chance which office or fax did he send this medical record request to?

## 2023-03-16 ENCOUNTER — OFFICE VISIT (OUTPATIENT)
Dept: OBGYN CLINIC | Facility: CLINIC | Age: 38
End: 2023-03-16

## 2023-03-16 VITALS — WEIGHT: 226 LBS | BODY MASS INDEX: 35.47 KG/M2 | HEIGHT: 67 IN

## 2023-03-16 DIAGNOSIS — M77.8 RIGHT WRIST TENDONITIS: ICD-10-CM

## 2023-03-16 DIAGNOSIS — M77.8 FLEXOR CARPI ULNARIS TENDINITIS: ICD-10-CM

## 2023-03-16 DIAGNOSIS — S60.221A CONTUSION OF RIGHT HAND, INITIAL ENCOUNTER: ICD-10-CM

## 2023-03-16 DIAGNOSIS — M77.8 EXTENSOR CARPI ULNARIS TENDINITIS: Primary | ICD-10-CM

## 2023-03-16 NOTE — LETTER
March 16, 2023     Patient: Marty Henry  YOB: 1985  Date of Visit: 3/16/2023      To Whom it May Concern:    Reinaldo Babcock is under my professional care  Sanchez Church was seen in my office on 3/16/2023  Sanchez Church is able to work with restrictions  Restrictions include no lifting greater than 15 lbs with right upper extremity  Restrictions to be in place until his upcoming surgery  If you have any questions or concerns, please don't hesitate to call           Sincerely,          Qamar Bay MD        CC: No Recipients

## 2023-03-16 NOTE — PROGRESS NOTES
ORTHOPAEDIC HAND, WRIST, AND ELBOW OFFICE  VISIT       ASSESSMENT/PLAN:      Diagnoses and all orders for this visit:    Extensor carpi ulnaris tendinitis    Flexor carpi ulnaris tendinitis    Contusion of right hand, initial encounter    Right wrist tendonitis        53-year-old male with right hand contusion, right ECU and FCR tendinitis with incomplete improvement after FCR steroid injection  He has symptoms of right ECU tendonitis proximal to the head of the ulna  His symptoms continue to limit the use of his right upper extremity despite conservative measures for over 3 months  · Diagnostics reviewed and physical exam performed  Diagnosis, treatment options and associated risks were discussed with the patient including no treatment, nonsurgical treatment and potential for surgical intervention  The patient was given the opportunity to ask questions regarding each  · Patient has tried non-operative treatments including oral medication, multiple steroid injections, bracing, home exercises all without relief of his pain  · At this time patient elects to proceed with right ECU and FCR tendon sheath release  Risks and benefits were discussed  Risks of the surgery are inclusive of but not limited to bleeding, infection, nerve injury, blood clot, worsening of symptoms, not achieving the anticipated results, persistent stiffness, weakness and the need for additional surgery  The patient verbally stated they understood those risks and would like to proceed with the surgery  Surgical consent was signed in the office today  I will see the patient the day of surgery  The patient verbalized understanding of exam findings and treatment plan  We engaged in the shared decision-making process and treatment options were discussed at length with the patient  Surgical and conservative management discussed today along with risks and benefits      Right FCR and ECU tendon sheath release  Consent obtained  Sedation    Follow Up:  Return for post-op  To Do Next Visit:  Re-evaluation of current issue        ____________________________________________________________________________________________________________________________________________      CHIEF COMPLAINT:  Chief Complaint   Patient presents with   • Left Wrist - Pain, Numbness, Swelling       SUBJECTIVE:  Elliott Christianson is a 30y o  year old RHD male who presents today for follow up evaluation of right hand contusion and right FCR and ECU tendinitis  At his last visit 6 weeks ago a cortisone injection was provided to the right FCR tendon sheath  Patient states he received approximately 40% of relief which has already worn off  His pain is still located at the distal medial aspect of his wrist  He also has some dorsal, ulnar wrist pain as well  Denies paraesthesias, secondary trauma, radiating pain  Pain is aggravated with activity and his work at [employer]  Has not initiated outpatient Physical Therapy  I have personally reviewed all the relevant PMH, PSH, SH, FH, Medications and allergies      PAST MEDICAL HISTORY:  Past Medical History:   Diagnosis Date   • Acute GI bleeding    • Asthma    • Facial trauma     baseball bat injury when was 17years old playing baseball       PAST SURGICAL HISTORY:  Past Surgical History:   Procedure Laterality Date   • EGD     • FACIAL RECONSTRUCTION SURGERY     • KNEE SURGERY     • NECK SURGERY         FAMILY HISTORY:  History reviewed  No pertinent family history      SOCIAL HISTORY:  Social History     Tobacco Use   • Smoking status: Former     Packs/day: 0 50     Types: Cigarettes   • Smokeless tobacco: Never   • Tobacco comments:     quit smoking cigaretyes almost a year ago   Vaping Use   • Vaping Use: Every day   • Substances: Nicotine   Substance Use Topics   • Alcohol use: Yes     Comment: occasional   • Drug use: No       MEDICATIONS:    Current Outpatient Medications:   •  albuterol (PROVENTIL HFA,VENTOLIN HFA) 90 mcg/act inhaler, Inhale 1 puff every 6 (six) hours as needed, Disp: , Rfl:   •  Diclofenac Sodium (VOLTAREN) 1 %, Apply 2 g topically 4 (four) times a day, Disp: 100 g, Rfl: 0  •  meloxicam (Mobic) 15 mg tablet, Take 1 tablet (15 mg total) by mouth daily, Disp: 14 tablet, Rfl: 0  •  pantoprazole (PROTONIX) 40 mg tablet, Take 1 tablet (40 mg total) by mouth 2 (two) times a day, Disp: 60 tablet, Rfl: 0  •  sucralfate (CARAFATE) 1 g tablet, Take 1 tablet (1 g total) by mouth 4 (four) times a day, Disp: 120 tablet, Rfl: 0    ALLERGIES:  Allergies   Allergen Reactions   • Augmentin [Amoxicillin-Pot Clavulanate] Rash         Review of Systems  Pertinent items are noted in HPI  All other systems were reviewed and are negative  VITALS:  Vitals:       LABS:  HgA1c: No results found for: HGBA1C  BMP:   Lab Results   Component Value Date    CALCIUM 7 8 (L) 03/05/2022    K 4 1 03/05/2022    CO2 27 03/05/2022     (H) 03/05/2022    BUN 26 (H) 03/05/2022    CREATININE 0 84 03/05/2022       _____________________________________________________  PHYSICAL EXAMINATION:  General: well developed and well nourished, alert, oriented times 3 and appears comfortable  Psychiatric: Normal  HEENT: Normocephalic, Atraumatic Trachea Midline, No torticollis  Pulmonary: No audible wheezing or respiratory distress   Abdomen/GI: Non tender, non distended   Cardiovascular: No pitting edema, 2+ radial pulse   Skin: No masses, erythema, lacerations, fluctation, ulcerations  Neurovascular: Sensation Intact to the Median, Ulnar, Radial Nerve, Motor Intact to the Median, Ulnar, Radial Nerve and Pulses Intact  Musculoskeletal: Normal, except as noted in detailed exam and in HPI  MUSCULOSKELETAL EXAMINATION:  Right hand and wrist exam:  Inspection: No open wounds or erythema  No swelling  No ecchymosis  No obvious visual deformity    Palpation: + Tenderness to palpation ECU proximal to the head of the ulna, FCR, and thenar muscle area    Nontender to palpation anatomic snuffbox, scaphoid tubercle, first dorsal extensor compartment   Range-of-motion: Normal wrist range of motion, full composite fist formation  Strength: 5/5 wrist flexion extension, thumb flexion extension,   Brisk capillary refill  Sensation intact to Ax/R/M/U nerve distributions  Special Tests: No significant laxity with UCL/RCL stress  Negative Finkelstein's     ___________________________________________________  STUDIES REVIEWED:  No studies to review         PROCEDURES PERFORMED:  Procedures  No Procedures performed today    _____________________________________________________      Adelso Asp    I,:  Marin Gaucher am acting as a scribe while in the presence of the attending physician :       I,:  Margarita Ruiz MD personally performed the services described in this documentation    as scribed in my presence :

## 2023-04-27 ENCOUNTER — OFFICE VISIT (OUTPATIENT)
Dept: OBGYN CLINIC | Facility: CLINIC | Age: 38
End: 2023-04-27

## 2023-04-27 VITALS
SYSTOLIC BLOOD PRESSURE: 129 MMHG | DIASTOLIC BLOOD PRESSURE: 84 MMHG | HEIGHT: 67 IN | HEART RATE: 76 BPM | WEIGHT: 224 LBS | BODY MASS INDEX: 35.16 KG/M2

## 2023-04-27 DIAGNOSIS — M77.8 EXTENSOR CARPI ULNARIS TENDINITIS: ICD-10-CM

## 2023-04-27 DIAGNOSIS — M77.8 RIGHT WRIST TENDONITIS: Primary | ICD-10-CM

## 2023-04-27 DIAGNOSIS — M77.8 FLEXOR CARPI ULNARIS TENDINITIS: ICD-10-CM

## 2023-04-27 NOTE — PROGRESS NOTES
PATIENT NAME: Juan Colindres    :  1985  MRN: [id number]   SURGERY DATE:  2023   PROCEDURE:    Right - Release of right 6th dorsal compartment;  RIght FCR tendon sheath release        Subjective:  Patient reports overall doing well  Has some right palmar, base of thumb soreness  No issues with incisions  No fever or chills  Objective:    Vitals:    23 1300   BP: 129/84   Pulse: 76      Right volar and ulnar incisions are heale without signs of infection  Full composite fist formation, normal wrist range of motion  Strength testing deferred  Sensation intact to light touch median radial ulnar nerve distribution  Palpable radial pulse    Assessment:  S/p  Right - Release of right 6th dorsal compartment;  RIght FCR tendon sheath release 2023    Plan: Increase normal daily activities as tolerated  No specific restrictions other than avoiding heavy lifting at this time  Over-the-counter pain medications as needed  Work note provided  Follow-up in 2 weeks for reevaluation

## 2023-04-27 NOTE — LETTER
April 27, 2023     Patient: Sara Delacruz  YOB: 1985  Date of Visit: 4/27/2023      To Whom it May Concern:    Lexx Martins is under my professional care  Micnichole Neely was seen in my office on 4/27/2023  Spike Neely is to remain out of work until next visit on 5/11/2023  Further updates will be provided then  If you have any questions or concerns, please don't hesitate to call           Sincerely,          Los Guerrero MD        CC: No Recipients

## 2023-05-11 ENCOUNTER — OFFICE VISIT (OUTPATIENT)
Dept: OBGYN CLINIC | Facility: CLINIC | Age: 38
End: 2023-05-11

## 2023-05-11 VITALS
HEART RATE: 94 BPM | DIASTOLIC BLOOD PRESSURE: 92 MMHG | WEIGHT: 224 LBS | BODY MASS INDEX: 35.16 KG/M2 | SYSTOLIC BLOOD PRESSURE: 157 MMHG | HEIGHT: 67 IN

## 2023-05-11 DIAGNOSIS — M77.8 RIGHT WRIST TENDONITIS: ICD-10-CM

## 2023-05-11 DIAGNOSIS — M77.8 FLEXOR CARPI ULNARIS TENDINITIS: ICD-10-CM

## 2023-05-11 DIAGNOSIS — M77.8 EXTENSOR CARPI ULNARIS TENDINITIS: Primary | ICD-10-CM

## 2023-05-11 NOTE — LETTER
May 11, 2023     Patient: Michaelyn Burkitt  YOB: 1985  Date of Visit: 5/11/2023      To Whom it May Concern:    Luan Johnson is under my professional care  Raz Florez was seen in my office on 5/11/2023  Raz Florez will have a 5 lb lifting restriction for his Right hand until next office visit    If you have any questions or concerns, please don't hesitate to call           Sincerely,          Clementina Randall MD

## 2023-05-11 NOTE — PROGRESS NOTES
PATIENT NAME: Alyce Shah    :  1985  MRN: [id number]   SURGERY DATE:  2023   PROCEDURE:    Right - Release of right 6th dorsal compartment;  RIght FCR tendon sheath release        Subjective:  Patient reports He is having some increased volar wrist and basal thumb pain since last visit  He believes it is due to him slightly increasing his activity and working on ROM and strength of hand  Some pain noted with Radial and ulnar wrist deveation  Used Heat and Ice but no pain medications  May use NSAIDs as desired  Objective:    Vitals:    23 1122   BP: 157/92   Pulse: 94      Right volar and ulnar incisions are well healed  Full composite fist formation, normal wrist range of motion  Strength testing deferred  Sensation intact to light touch median radial ulnar nerve distribution  Palpable radial pulse    Assessment:  S/p  Right - Release of right 6th dorsal compartment;  RIght FCR tendon sheath release 2023    Plan:   Script for OT provided for scar massage and desensatization   Over-the-counter pain medications as needed  Activities as tolerated  Work note provided  Follow-up in 4 weeks for reevaluation

## 2023-06-08 ENCOUNTER — OFFICE VISIT (OUTPATIENT)
Dept: OBGYN CLINIC | Facility: CLINIC | Age: 38
End: 2023-06-08

## 2023-06-08 VITALS
HEART RATE: 69 BPM | SYSTOLIC BLOOD PRESSURE: 120 MMHG | HEIGHT: 67 IN | DIASTOLIC BLOOD PRESSURE: 79 MMHG | BODY MASS INDEX: 35.16 KG/M2 | WEIGHT: 224 LBS

## 2023-06-08 DIAGNOSIS — M77.8 EXTENSOR CARPI ULNARIS TENDINITIS: Primary | ICD-10-CM

## 2023-06-08 DIAGNOSIS — M77.8 FLEXOR CARPI ULNARIS TENDINITIS: ICD-10-CM

## 2023-06-08 PROCEDURE — 99024 POSTOP FOLLOW-UP VISIT: CPT | Performed by: SURGERY

## 2023-06-08 NOTE — PROGRESS NOTES
PATIENT NAME: Brooke Tucker    :  1985  MRN: [id number]   SURGERY DATE:  2023   PROCEDURE:    Right - Release of right 6th dorsal compartment;  RIght FCR tendon sheath release        Subjective:  Doing well  He states he is still getting some soreness in his R UE from activities  He uses heat or occasional Tylenol for pain but cannot take NSAID's due to stomach issues  Volar scar remains sensitive to the touch      Objective:    Vitals:    23 1057   BP: 120/79   Pulse: 69      Right volar and ulnar incisions are well healed    Full composite fist formation, normal wrist range of motion  Strength testing deferred  Sensation intact to light touch median radial ulnar nerve distribution  Palpable radial pulse    Assessment:  S/p  Right - Release of right 6th dorsal compartment;  RIght FCR tendon sheath release 2023    Plan:   Massage scar as they can cont to soften up to 1 yr after surgery  Activities as tolerated  Work note provided  Follow-up PRN

## 2023-06-08 NOTE — LETTER
June 8, 2023     Patient: Do Flores  YOB: 1985  Date of Visit: 6/8/2023      To Whom it May Concern:    Jae Ramon is under my professional care  Rafiq Chavez was seen in my office on 6/8/2023  Rafiq Kathy may return to work with no restrictions on 6/23/2023    If you have any questions or concerns, please don't hesitate to call           Sincerely,          Emily Waller MD

## 2023-06-30 ENCOUNTER — TELEPHONE (OUTPATIENT)
Dept: OBGYN CLINIC | Facility: HOSPITAL | Age: 38
End: 2023-06-30

## 2024-01-08 ENCOUNTER — TELEPHONE (OUTPATIENT)
Dept: OBGYN CLINIC | Facility: HOSPITAL | Age: 39
End: 2024-01-08

## 2024-01-08 NOTE — TELEPHONE ENCOUNTER
Caller: Patient    Doctor: Rudy    Reason for call: Can we please place the patient's [employer] forms from 1/5/24 in his MyChart.  Please advise.    Call back#: [phone]

## 2024-06-06 ENCOUNTER — TELEPHONE (OUTPATIENT)
Dept: GASTROENTEROLOGY | Facility: CLINIC | Age: 39
End: 2024-06-06

## 2024-06-06 ENCOUNTER — OFFICE VISIT (OUTPATIENT)
Dept: GASTROENTEROLOGY | Facility: CLINIC | Age: 39
End: 2024-06-06
Payer: COMMERCIAL

## 2024-06-06 VITALS
DIASTOLIC BLOOD PRESSURE: 85 MMHG | SYSTOLIC BLOOD PRESSURE: 132 MMHG | BODY MASS INDEX: 34.53 KG/M2 | HEIGHT: 67 IN | HEART RATE: 85 BPM | WEIGHT: 220 LBS

## 2024-06-06 DIAGNOSIS — R10.10 PAIN OF UPPER ABDOMEN: Primary | ICD-10-CM

## 2024-06-06 DIAGNOSIS — R14.0 BLOATING: ICD-10-CM

## 2024-06-06 DIAGNOSIS — K21.9 GASTROESOPHAGEAL REFLUX DISEASE WITHOUT ESOPHAGITIS: ICD-10-CM

## 2024-06-06 PROCEDURE — 99214 OFFICE O/P EST MOD 30 MIN: CPT | Performed by: INTERNAL MEDICINE

## 2024-06-06 RX ORDER — FLUTICASONE PROPIONATE 50 MCG
2 SPRAY, SUSPENSION (ML) NASAL DAILY
COMMUNITY
Start: 2024-06-04

## 2024-06-06 RX ORDER — LORATADINE 10 MG/1
10 TABLET ORAL DAILY
COMMUNITY
Start: 2024-06-04 | End: 2025-06-04

## 2024-06-06 RX ORDER — AZELASTINE 1 MG/ML
SPRAY, METERED NASAL
COMMUNITY
Start: 2024-06-04

## 2024-06-06 NOTE — TELEPHONE ENCOUNTER
Scheduled date of EGD(as of today): 6/26/24  Physician performing EGD: Dr. Goodwin  Location of EGD:   Instructions reviewed with patient by: tl given at [facility]  Clearances: n/a

## 2024-06-06 NOTE — H&P (VIEW-ONLY)
[facility] [place] - Outpatient Follow-up Note  Giovanni Watts 38 y.o. male MRN: [id number]  Encounter: [id number]          ASSESSMENT AND PLAN:      1. Pain of upper abdomen  -     CBC and differential; Future  -     Comprehensive metabolic panel; Future  -     Celiac Disease Panel; Future  -     US abdomen limited; Future; Expected date: 06/06/2024  -     EGD; Future; Expected date: 06/06/2024  2. Bloating  -     CBC and differential; Future  -     Comprehensive metabolic panel; Future  -     Celiac Disease Panel; Future  -     US abdomen limited; Future; Expected date: 06/06/2024  -     EGD; Future; Expected date: 06/06/2024  3. Gastroesophageal reflux disease without esophagitis  -     EGD; Future; Expected date: 06/06/2024      History of occasional heartburn indigestion abdominal discomfort bloating, small hiatal hernia on EGD couple years ago.  No weight loss and/or evidence of active GI blood loss.  Clinically no evidence of acute abdomen is obstruction.  Most likely symptoms from dyspepsia/IBS.  Anxiety contributing as well.  Has had some excess evaluation in the past to include CT abdomen last year unremarkable, EGD couple years ago.  Advised him to get a lactose-free low gluten diet, check labs and ultrasound, schedule EGD for other upper GI pathology.  If symptoms persist then further evaluation.  Will call us after the workup is done.      ______________________________________________________________________    SUBJECTIVE:      Patient came in for evaluation of his history of occasional upper central abdominal pain fullness bloating acid reflux indigestion appetite is fair has gained a few pounds, denies any melena hematochezia or bloody stools hematemesis, many years a couple years ago he had some dark stools was taking a lot of NSAIDs at the time, EGD was done.  He denies any dysphagia coughing choking spells nocturnal reflux, denies any chest pain shortness of breath fever chills  rash.  Has been taking some PPI occasionally.  Also stressed with his financial situation, out of work at this time used to be .  No history of CVA CCD stents pacemakers.  Does not vape.  Diet medications more than 10 systems reviewed.      REVIEW OF SYSTEMS IS OTHERWISE NEGATIVE.      Historical Information   Past Medical History:   Diagnosis Date   • Acute GI bleeding    • Asthma    • Wall esophagus    • Diverticulitis of colon    • Facial trauma     baseball bat injury when was 16 years old playing baseball   • GI (gastrointestinal bleed)    • Wears glasses      Past Surgical History:   Procedure Laterality Date   • COLONOSCOPY     • EGD     • FACIAL RECONSTRUCTION SURGERY     • KNEE SURGERY Right     skin graft   • NECK SURGERY      C7-9 fusion   • [place] INCISION EXTENSOR TENDON SHEATH WRIST Right 2023    Procedure: Release of right 6th dorsal compartment;  Surgeon: Charly Grant MD;  Location: [facility];  Service: Orthopedics   • SYNOVECTOMY Right 2023    Procedure: RIght FCR tendon sheath release;  Surgeon: Charly Grant MD;  Location: [facility];  Service: Orthopedics     Social History   Social History     Substance and Sexual Activity   Alcohol Use Yes   • Alcohol/week: 5.0 standard drinks of alcohol   • Types: 5 Shots of liquor per week    Comment: 1 x weekly     Social History     Substance and Sexual Activity   Drug Use No     Social History     Tobacco Use   Smoking Status Former   • Current packs/day: 0.00   • Average packs/day: 0.5 packs/day for 15.0 years (7.5 ttl pk-yrs)   • Types: Cigarettes   • Start date: 2005   • Quit date: 2020   • Years since quittin.4   Smokeless Tobacco Never   Tobacco Comments    Vaping and would like to wuut     History reviewed. No pertinent family history.    Meds/Allergies       Current Outpatient Medications:   •  albuterol (PROVENTIL HFA,VENTOLIN HFA) 90 mcg/act inhaler  •  azelastine (ASTELIN) 0.1 % nasal spray  •  fluticasone  "(FLONASE) 50 mcg/act nasal spray  •  loratadine (CLARITIN) 10 mg tablet  •  Multiple Vitamins-Minerals (CENTRUM ADULTS PO)  •  pantoprazole (PROTONIX) 40 mg tablet  •  acetaminophen-codeine (TYLENOL #3) 300-30 mg per tablet  •  Diclofenac Sodium (VOLTAREN) 1 %  •  meloxicam (Mobic) 15 mg tablet  •  Omega-3 Fatty Acids (FISH OIL PO)  •  sucralfate (CARAFATE) 1 g tablet    Allergies   Allergen Reactions   • Augmentin [Amoxicillin-Pot Clavulanate] Rash           Objective     Blood pressure 132/85, pulse 85, height 5' 7\" (1.702 m), weight 99.8 kg (220 lb). Body mass index is 34.46 kg/m².      PHYSICAL EXAM:      General Appearance:   Alert, cooperative, no distress   HEENT:   Normocephalic, atraumatic, anicteric.     Neck:  Supple, symmetrical, trachea midline   Lungs:   Clear to auscultation bilaterally; no rales, rhonchi or wheezing; respirations unlabored    Heart::   Regular rate and rhythm; no murmur.   Abdomen:   Soft, non-tender, non-distended; normal bowel sounds; no masses, no organomegaly    Genitalia:   Deferred    Rectal:   Deferred    Extremities:  No cyanosis, clubbing or edema    Skin:  No jaundice, rashes, or lesions    Lymph nodes:  No palpable cervical lymphadenopathy        Lab Results:   No visits with results within 1 Day(s) from this visit.   Latest known visit with results is:   Office Visit on 12/28/2022   Component Date Value   • SARS-CoV-2 12/28/2022 Negative    • INFLUENZA A PCR 12/28/2022 Negative    • INFLUENZA B PCR 12/28/2022 Negative          Radiology Results:   No results found.  Answers submitted by the patient for this visit:  Questionnaire about: Abdominal pain (Submitted on 6/5/2024)  Chief Complaint: Abdominal pain    "

## 2024-06-06 NOTE — PROGRESS NOTES
[facility] [place] - Outpatient Follow-up Note  Giovanni Watts 38 y.o. male MRN: [id number]  Encounter: [id number]          ASSESSMENT AND PLAN:      1. Pain of upper abdomen  -     CBC and differential; Future  -     Comprehensive metabolic panel; Future  -     Celiac Disease Panel; Future  -     US abdomen limited; Future; Expected date: 06/06/2024  -     EGD; Future; Expected date: 06/06/2024  2. Bloating  -     CBC and differential; Future  -     Comprehensive metabolic panel; Future  -     Celiac Disease Panel; Future  -     US abdomen limited; Future; Expected date: 06/06/2024  -     EGD; Future; Expected date: 06/06/2024  3. Gastroesophageal reflux disease without esophagitis  -     EGD; Future; Expected date: 06/06/2024      History of occasional heartburn indigestion abdominal discomfort bloating, small hiatal hernia on EGD couple years ago.  No weight loss and/or evidence of active GI blood loss.  Clinically no evidence of acute abdomen is obstruction.  Most likely symptoms from dyspepsia/IBS.  Anxiety contributing as well.  Has had some excess evaluation in the past to include CT abdomen last year unremarkable, EGD couple years ago.  Advised him to get a lactose-free low gluten diet, check labs and ultrasound, schedule EGD for other upper GI pathology.  If symptoms persist then further evaluation.  Will call us after the workup is done.      ______________________________________________________________________    SUBJECTIVE:      Patient came in for evaluation of his history of occasional upper central abdominal pain fullness bloating acid reflux indigestion appetite is fair has gained a few pounds, denies any melena hematochezia or bloody stools hematemesis, many years a couple years ago he had some dark stools was taking a lot of NSAIDs at the time, EGD was done.  He denies any dysphagia coughing choking spells nocturnal reflux, denies any chest pain shortness of breath fever chills  rash.  Has been taking some PPI occasionally.  Also stressed with his financial situation, out of work at this time used to be .  No history of CVA CCD stents pacemakers.  Does not vape.  Diet medications more than 10 systems reviewed.      REVIEW OF SYSTEMS IS OTHERWISE NEGATIVE.      Historical Information   Past Medical History:   Diagnosis Date   • Acute GI bleeding    • Asthma    • Wall esophagus    • Diverticulitis of colon    • Facial trauma     baseball bat injury when was 16 years old playing baseball   • GI (gastrointestinal bleed)    • Wears glasses      Past Surgical History:   Procedure Laterality Date   • COLONOSCOPY     • EGD     • FACIAL RECONSTRUCTION SURGERY     • KNEE SURGERY Right     skin graft   • NECK SURGERY      C7-9 fusion   • [place] INCISION EXTENSOR TENDON SHEATH WRIST Right 2023    Procedure: Release of right 6th dorsal compartment;  Surgeon: Charly Grant MD;  Location: [facility];  Service: Orthopedics   • SYNOVECTOMY Right 2023    Procedure: RIght FCR tendon sheath release;  Surgeon: Charly Grant MD;  Location: [facility];  Service: Orthopedics     Social History   Social History     Substance and Sexual Activity   Alcohol Use Yes   • Alcohol/week: 5.0 standard drinks of alcohol   • Types: 5 Shots of liquor per week    Comment: 1 x weekly     Social History     Substance and Sexual Activity   Drug Use No     Social History     Tobacco Use   Smoking Status Former   • Current packs/day: 0.00   • Average packs/day: 0.5 packs/day for 15.0 years (7.5 ttl pk-yrs)   • Types: Cigarettes   • Start date: 2005   • Quit date: 2020   • Years since quittin.4   Smokeless Tobacco Never   Tobacco Comments    Vaping and would like to wuut     History reviewed. No pertinent family history.    Meds/Allergies       Current Outpatient Medications:   •  albuterol (PROVENTIL HFA,VENTOLIN HFA) 90 mcg/act inhaler  •  azelastine (ASTELIN) 0.1 % nasal spray  •  fluticasone  "(FLONASE) 50 mcg/act nasal spray  •  loratadine (CLARITIN) 10 mg tablet  •  Multiple Vitamins-Minerals (CENTRUM ADULTS PO)  •  pantoprazole (PROTONIX) 40 mg tablet  •  acetaminophen-codeine (TYLENOL #3) 300-30 mg per tablet  •  Diclofenac Sodium (VOLTAREN) 1 %  •  meloxicam (Mobic) 15 mg tablet  •  Omega-3 Fatty Acids (FISH OIL PO)  •  sucralfate (CARAFATE) 1 g tablet    Allergies   Allergen Reactions   • Augmentin [Amoxicillin-Pot Clavulanate] Rash           Objective     Blood pressure 132/85, pulse 85, height 5' 7\" (1.702 m), weight 99.8 kg (220 lb). Body mass index is 34.46 kg/m².      PHYSICAL EXAM:      General Appearance:   Alert, cooperative, no distress   HEENT:   Normocephalic, atraumatic, anicteric.     Neck:  Supple, symmetrical, trachea midline   Lungs:   Clear to auscultation bilaterally; no rales, rhonchi or wheezing; respirations unlabored    Heart::   Regular rate and rhythm; no murmur.   Abdomen:   Soft, non-tender, non-distended; normal bowel sounds; no masses, no organomegaly    Genitalia:   Deferred    Rectal:   Deferred    Extremities:  No cyanosis, clubbing or edema    Skin:  No jaundice, rashes, or lesions    Lymph nodes:  No palpable cervical lymphadenopathy        Lab Results:   No visits with results within 1 Day(s) from this visit.   Latest known visit with results is:   Office Visit on 12/28/2022   Component Date Value   • SARS-CoV-2 12/28/2022 Negative    • INFLUENZA A PCR 12/28/2022 Negative    • INFLUENZA B PCR 12/28/2022 Negative          Radiology Results:   No results found.  Answers submitted by the patient for this visit:  Questionnaire about: Abdominal pain (Submitted on 6/5/2024)  Chief Complaint: Abdominal pain    "

## 2024-06-07 ENCOUNTER — HOSPITAL ENCOUNTER (OUTPATIENT)
Dept: ULTRASOUND IMAGING | Facility: HOSPITAL | Age: 39
Discharge: HOME | End: 2024-06-07
Attending: INTERNAL MEDICINE
Payer: COMMERCIAL

## 2024-06-07 ENCOUNTER — TELEPHONE (OUTPATIENT)
Dept: ULTRASOUND IMAGING | Facility: HOSPITAL | Age: 39
End: 2024-06-07

## 2024-06-07 ENCOUNTER — APPOINTMENT (OUTPATIENT)
Dept: LAB | Facility: CLINIC | Age: 39
End: 2024-06-07
Payer: COMMERCIAL

## 2024-06-07 DIAGNOSIS — R10.10 PAIN OF UPPER ABDOMEN: ICD-10-CM

## 2024-06-07 DIAGNOSIS — R14.0 BLOATING: ICD-10-CM

## 2024-06-07 LAB
ALBUMIN SERPL BCP-MCNC: 4.5 G/DL (ref 3.5–5)
ALP SERPL-CCNC: 99 U/L (ref 34–104)
ALT SERPL W P-5'-P-CCNC: 44 U/L (ref 7–52)
ANION GAP SERPL CALCULATED.3IONS-SCNC: 7 MMOL/L (ref 4–13)
AST SERPL W P-5'-P-CCNC: 31 U/L (ref 13–39)
BASOPHILS # BLD AUTO: 0.05 THOUSANDS/ÂΜL (ref 0–0.1)
BASOPHILS NFR BLD AUTO: 1 % (ref 0–1)
BILIRUB SERPL-MCNC: 0.37 MG/DL (ref 0.2–1)
BUN SERPL-MCNC: 13 MG/DL (ref 5–25)
CALCIUM SERPL-MCNC: 8.9 MG/DL (ref 8.4–10.2)
CHLORIDE SERPL-SCNC: 108 MMOL/L (ref 96–108)
CO2 SERPL-SCNC: 25 MMOL/L (ref 21–32)
CREAT SERPL-MCNC: 0.97 MG/DL (ref 0.6–1.3)
EOSINOPHIL # BLD AUTO: 0.14 THOUSAND/ÂΜL (ref 0–0.61)
EOSINOPHIL NFR BLD AUTO: 2 % (ref 0–6)
ERYTHROCYTE [DISTWIDTH] IN BLOOD BY AUTOMATED COUNT: 12.3 % (ref 11.6–15.1)
GFR SERPL CREATININE-BSD FRML MDRD: 100 ML/MIN/1.73SQ M
GLIADIN PEPTIDE IGA SER-ACNC: 0.7 U/ML
GLIADIN PEPTIDE IGA SER-ACNC: NEGATIVE
GLIADIN PEPTIDE IGG SER-ACNC: 0.7 U/ML
GLIADIN PEPTIDE IGG SER-ACNC: NEGATIVE
GLUCOSE P FAST SERPL-MCNC: 80 MG/DL (ref 65–99)
HCT VFR BLD AUTO: 50.3 % (ref 36.5–49.3)
HGB BLD-MCNC: 16.3 G/DL (ref 12–17)
IGA SERPL-MCNC: 280 MG/DL (ref 66–433)
IMM GRANULOCYTES # BLD AUTO: 0.01 THOUSAND/UL (ref 0–0.2)
IMM GRANULOCYTES NFR BLD AUTO: 0 % (ref 0–2)
LYMPHOCYTES # BLD AUTO: 1.41 THOUSANDS/ÂΜL (ref 0.6–4.47)
LYMPHOCYTES NFR BLD AUTO: 22 % (ref 14–44)
MCH RBC QN AUTO: 28.1 PG (ref 26.8–34.3)
MCHC RBC AUTO-ENTMCNC: 32.4 G/DL (ref 31.4–37.4)
MCV RBC AUTO: 87 FL (ref 82–98)
MONOCYTES # BLD AUTO: 0.46 THOUSAND/ÂΜL (ref 0.17–1.22)
MONOCYTES NFR BLD AUTO: 7 % (ref 4–12)
NEUTROPHILS # BLD AUTO: 4.38 THOUSANDS/ÂΜL (ref 1.85–7.62)
NEUTS SEG NFR BLD AUTO: 68 % (ref 43–75)
NRBC BLD AUTO-RTO: 0 /100 WBCS
PLATELET # BLD AUTO: 270 THOUSANDS/UL (ref 149–390)
PMV BLD AUTO: 11.4 FL (ref 8.9–12.7)
POTASSIUM SERPL-SCNC: 3.9 MMOL/L (ref 3.5–5.3)
PROT SERPL-MCNC: 7.5 G/DL (ref 6.4–8.4)
RBC # BLD AUTO: 5.8 MILLION/UL (ref 3.88–5.62)
SODIUM SERPL-SCNC: 140 MMOL/L (ref 135–147)
TTG IGA SER-ACNC: <0.5 U/ML
TTG IGA SER-ACNC: NEGATIVE
TTG IGG SER-ACNC: <0.8 U/ML
TTG IGG SER-ACNC: NEGATIVE
WBC # BLD AUTO: 6.45 THOUSAND/UL (ref 4.31–10.16)

## 2024-06-07 PROCEDURE — 85025 COMPLETE CBC W/AUTO DIFF WBC: CPT

## 2024-06-07 PROCEDURE — 82784 ASSAY IGA/IGD/IGG/IGM EACH: CPT

## 2024-06-07 PROCEDURE — 76705 ECHO EXAM OF ABDOMEN: CPT

## 2024-06-07 PROCEDURE — 86364 TISS TRNSGLTMNASE EA IG CLAS: CPT

## 2024-06-07 PROCEDURE — 86258 DGP ANTIBODY EACH IG CLASS: CPT

## 2024-06-07 PROCEDURE — 36415 COLL VENOUS BLD VENIPUNCTURE: CPT

## 2024-06-07 PROCEDURE — 80053 COMPREHEN METABOLIC PANEL: CPT

## 2024-06-07 NOTE — TELEPHONE ENCOUNTER
"Spoke with patient before exam that we do not look at anything in the LLQ with ultrasound where he is having pain. Patient stated that he does have some RUQ pain and the indication on the order did say \"pain in upper abdomen\". So we did a RUQ ultrasound. Told patient I would send a message through [employer] to let the DR know.       "

## 2024-06-26 ENCOUNTER — ANESTHESIA (OUTPATIENT)
Dept: GASTROENTEROLOGY | Facility: HOSPITAL | Age: 39
End: 2024-06-26

## 2024-06-26 ENCOUNTER — ANESTHESIA EVENT (OUTPATIENT)
Dept: GASTROENTEROLOGY | Facility: HOSPITAL | Age: 39
End: 2024-06-26

## 2024-06-26 ENCOUNTER — HOSPITAL ENCOUNTER (OUTPATIENT)
Dept: GASTROENTEROLOGY | Facility: HOSPITAL | Age: 39
Setting detail: OUTPATIENT SURGERY
Discharge: HOME | End: 2024-06-26
Attending: INTERNAL MEDICINE
Payer: COMMERCIAL

## 2024-06-26 VITALS
HEIGHT: 67 IN | BODY MASS INDEX: 33.27 KG/M2 | HEART RATE: 76 BPM | WEIGHT: 212 LBS | DIASTOLIC BLOOD PRESSURE: 62 MMHG | RESPIRATION RATE: 16 BRPM | OXYGEN SATURATION: 99 % | SYSTOLIC BLOOD PRESSURE: 126 MMHG | TEMPERATURE: 97.5 F

## 2024-06-26 DIAGNOSIS — R14.0 BLOATING: ICD-10-CM

## 2024-06-26 DIAGNOSIS — R10.10 PAIN OF UPPER ABDOMEN: ICD-10-CM

## 2024-06-26 DIAGNOSIS — K21.9 GASTROESOPHAGEAL REFLUX DISEASE WITHOUT ESOPHAGITIS: ICD-10-CM

## 2024-06-26 PROCEDURE — 88305 TISSUE EXAM BY PATHOLOGIST: CPT | Performed by: PATHOLOGY

## 2024-06-26 PROCEDURE — 43239 EGD BIOPSY SINGLE/MULTIPLE: CPT | Performed by: INTERNAL MEDICINE

## 2024-06-26 PROCEDURE — 43251 EGD REMOVE LESION SNARE: CPT | Performed by: INTERNAL MEDICINE

## 2024-06-26 RX ORDER — PROPOFOL 10 MG/ML
INJECTION, EMULSION INTRAVENOUS CONTINUOUS PRN
Status: DISCONTINUED | OUTPATIENT
Start: 2024-06-26 | End: 2024-06-26

## 2024-06-26 RX ORDER — PROPOFOL 10 MG/ML
INJECTION, EMULSION INTRAVENOUS AS NEEDED
Status: DISCONTINUED | OUTPATIENT
Start: 2024-06-26 | End: 2024-06-26

## 2024-06-26 RX ORDER — SODIUM CHLORIDE, SODIUM LACTATE, POTASSIUM CHLORIDE, CALCIUM CHLORIDE 600; 310; 30; 20 MG/100ML; MG/100ML; MG/100ML; MG/100ML
INJECTION, SOLUTION INTRAVENOUS CONTINUOUS PRN
Status: DISCONTINUED | OUTPATIENT
Start: 2024-06-26 | End: 2024-06-26

## 2024-06-26 RX ADMIN — PROPOFOL 150 MG: 10 INJECTION, EMULSION INTRAVENOUS at 13:17

## 2024-06-26 RX ADMIN — PROPOFOL 150 MCG/KG/MIN: 10 INJECTION, EMULSION INTRAVENOUS at 13:17

## 2024-06-26 RX ADMIN — SODIUM CHLORIDE, SODIUM LACTATE, POTASSIUM CHLORIDE, AND CALCIUM CHLORIDE: .6; .31; .03; .02 INJECTION, SOLUTION INTRAVENOUS at 13:05

## 2024-06-26 NOTE — INTERVAL H&P NOTE
H&P reviewed. After examining the patient I find no changes in the patients condition since the H&P had been written.    Vitals:    06/26/24 1221   BP: 130/86   Pulse: 73   Resp: 18   Temp: (!) 97.3 °F (36.3 °C)   SpO2: 96%

## 2024-06-26 NOTE — ANESTHESIA PREPROCEDURE EVALUATION
Procedure:  EGD    Relevant Problems   ANESTHESIA (within normal limits)      CARDIO (within normal limits)      ENDO (within normal limits)      GI/HEPATIC   (+) Hematemesis      /RENAL (within normal limits)      HEMATOLOGY (within normal limits)      MUSCULOSKELETAL (within normal limits)      NEURO/PSYCH (within normal limits)      PULMONARY   (+) Asthma      Digestive   (+) Melena      Behavioral Health   (+) Vapes nicotine containing substance        Physical Exam    Airway    Mallampati score: II  TM Distance: >3 FB  Neck ROM: full     Dental   No notable dental hx     Cardiovascular  Rhythm: regular, Rate: normal, Cardiovascular exam normal    Pulmonary  Pulmonary exam normal Breath sounds clear to auscultation    Other Findings        Anesthesia Plan  ASA Score- 2     Anesthesia Type- IV sedation with anesthesia with ASA Monitors.         Additional Monitors:     Airway Plan:            Plan Factors-Exercise tolerance (METS): >4 METS.    Chart reviewed. EKG reviewed. Imaging results reviewed. Existing labs reviewed. Patient summary reviewed.                  Induction- intravenous.    Postoperative Plan-     Perioperative Resuscitation Plan - Level 1 - Full Code.       Informed Consent- Anesthetic plan and risks discussed with patient.  I personally reviewed this patient with the CRNA. Discussed and agreed on the Anesthesia Plan with the CRNA..

## 2024-06-28 PROCEDURE — 88305 TISSUE EXAM BY PATHOLOGIST: CPT | Performed by: PATHOLOGY

## 2024-06-29 NOTE — RESULT ENCOUNTER NOTE
Please call the patient regarding his biopsy generally unremarkable.  Gastric polyp was benign.  No H. pylori infection.. Follow up in my office as needed

## 2024-08-30 ENCOUNTER — OFFICE VISIT (OUTPATIENT)
Dept: GASTROENTEROLOGY | Facility: CLINIC | Age: 39
End: 2024-08-30
Payer: COMMERCIAL

## 2024-08-30 ENCOUNTER — TELEPHONE (OUTPATIENT)
Dept: GASTROENTEROLOGY | Facility: CLINIC | Age: 39
End: 2024-08-30

## 2024-08-30 VITALS
SYSTOLIC BLOOD PRESSURE: 121 MMHG | DIASTOLIC BLOOD PRESSURE: 83 MMHG | BODY MASS INDEX: 32.74 KG/M2 | HEART RATE: 97 BPM | HEIGHT: 68 IN | WEIGHT: 216 LBS

## 2024-08-30 DIAGNOSIS — K58.0 IRRITABLE BOWEL SYNDROME WITH DIARRHEA: ICD-10-CM

## 2024-08-30 DIAGNOSIS — K21.9 GASTROESOPHAGEAL REFLUX DISEASE WITHOUT ESOPHAGITIS: ICD-10-CM

## 2024-08-30 DIAGNOSIS — R10.10 PAIN OF UPPER ABDOMEN: Primary | ICD-10-CM

## 2024-08-30 DIAGNOSIS — R14.0 BLOATING: ICD-10-CM

## 2024-08-30 DIAGNOSIS — R19.4 CHANGE IN BOWEL HABIT: ICD-10-CM

## 2024-08-30 PROCEDURE — 99214 OFFICE O/P EST MOD 30 MIN: CPT | Performed by: INTERNAL MEDICINE

## 2024-08-30 RX ORDER — POLYETHYLENE GLYCOL-3350 AND ELECTROLYTES 236; 6.74; 5.86; 2.97; 22.74 G/274.31G; G/274.31G; G/274.31G; G/274.31G; G/274.31G
4 POWDER, FOR SOLUTION ORAL ONCE
Qty: 4000 ML | Refills: 0 | Status: SHIPPED | OUTPATIENT
Start: 2024-08-30 | End: 2024-08-30
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
## 2024-08-30 NOTE — TELEPHONE ENCOUNTER
Scheduled date of colonoscopy (as of today): 11/19/24  Physician performing colonoscopy: Dr Goodwin  Location of colonoscopy:   Bowel prep reviewed with patient: Golytely given at appt   Instructions reviewed with patient by: ls  Clearances: n/a

## 2024-11-19 ENCOUNTER — ANESTHESIA EVENT (OUTPATIENT)
Dept: GASTROENTEROLOGY | Facility: HOSPITAL | Age: 39
End: 2024-11-19
Payer: COMMERCIAL

## 2024-11-19 ENCOUNTER — ANESTHESIA (OUTPATIENT)
Dept: GASTROENTEROLOGY | Facility: HOSPITAL | Age: 39
End: 2024-11-19
Payer: COMMERCIAL

## 2024-11-19 ENCOUNTER — HOSPITAL ENCOUNTER (OUTPATIENT)
Dept: GASTROENTEROLOGY | Facility: HOSPITAL | Age: 39
Setting detail: OUTPATIENT SURGERY
Discharge: HOME | End: 2024-11-19
Attending: INTERNAL MEDICINE
Payer: COMMERCIAL

## 2024-11-19 VITALS
HEART RATE: 79 BPM | HEIGHT: 67 IN | WEIGHT: 215 LBS | BODY MASS INDEX: 33.74 KG/M2 | OXYGEN SATURATION: 97 % | DIASTOLIC BLOOD PRESSURE: 73 MMHG | RESPIRATION RATE: 18 BRPM | SYSTOLIC BLOOD PRESSURE: 111 MMHG | TEMPERATURE: 97.1 F

## 2024-11-19 DIAGNOSIS — R19.4 CHANGE IN BOWEL HABIT: ICD-10-CM

## 2024-11-19 PROCEDURE — 45385 COLONOSCOPY W/LESION REMOVAL: CPT | Performed by: INTERNAL MEDICINE

## 2024-11-19 PROCEDURE — 45380 COLONOSCOPY AND BIOPSY: CPT | Performed by: INTERNAL MEDICINE

## 2024-11-19 PROCEDURE — 88305 TISSUE EXAM BY PATHOLOGIST: CPT | Performed by: SPECIALIST

## 2024-11-19 RX ORDER — PROPOFOL 10 MG/ML
INJECTION, EMULSION INTRAVENOUS AS NEEDED
Status: DISCONTINUED | OUTPATIENT
Start: 2024-11-19 | End: 2024-11-19

## 2024-11-19 RX ORDER — SODIUM CHLORIDE, SODIUM LACTATE, POTASSIUM CHLORIDE, CALCIUM CHLORIDE 600; 310; 30; 20 MG/100ML; MG/100ML; MG/100ML; MG/100ML
125 INJECTION, SOLUTION INTRAVENOUS CONTINUOUS
Status: DISCONTINUED | OUTPATIENT
Start: 2024-11-19 | End: 2024-11-23 | Stop reason: HOSPADM

## 2024-11-19 RX ORDER — SODIUM CHLORIDE, SODIUM LACTATE, POTASSIUM CHLORIDE, CALCIUM CHLORIDE 600; 310; 30; 20 MG/100ML; MG/100ML; MG/100ML; MG/100ML
INJECTION, SOLUTION INTRAVENOUS CONTINUOUS PRN
Status: DISCONTINUED | OUTPATIENT
Start: 2024-11-19 | End: 2024-11-19

## 2024-11-19 RX ORDER — LIDOCAINE HYDROCHLORIDE 10 MG/ML
INJECTION, SOLUTION EPIDURAL; INFILTRATION; INTRACAUDAL; PERINEURAL AS NEEDED
Status: DISCONTINUED | OUTPATIENT
Start: 2024-11-19 | End: 2024-11-19

## 2024-11-19 RX ORDER — FLUOXETINE 10 MG/1
10 CAPSULE ORAL DAILY
COMMUNITY

## 2024-11-19 RX ORDER — SODIUM CHLORIDE, SODIUM LACTATE, POTASSIUM CHLORIDE, CALCIUM CHLORIDE 600; 310; 30; 20 MG/100ML; MG/100ML; MG/100ML; MG/100ML
125 INJECTION, SOLUTION INTRAVENOUS CONTINUOUS
Status: CANCELLED | OUTPATIENT
Start: 2024-11-19

## 2024-11-19 RX ADMIN — PROPOFOL 150 MG: 10 INJECTION, EMULSION INTRAVENOUS at 14:09

## 2024-11-19 RX ADMIN — PROPOFOL 50 MG: 10 INJECTION, EMULSION INTRAVENOUS at 14:12

## 2024-11-19 RX ADMIN — SODIUM CHLORIDE, SODIUM LACTATE, POTASSIUM CHLORIDE, AND CALCIUM CHLORIDE: .6; .31; .03; .02 INJECTION, SOLUTION INTRAVENOUS at 14:07

## 2024-11-19 RX ADMIN — LIDOCAINE HYDROCHLORIDE 50 MG: 10 INJECTION, SOLUTION EPIDURAL; INFILTRATION; INTRACAUDAL at 14:09

## 2024-11-19 RX ADMIN — PROPOFOL 50 MG: 10 INJECTION, EMULSION INTRAVENOUS at 14:16

## 2024-11-19 RX ADMIN — PROPOFOL 50 MG: 10 INJECTION, EMULSION INTRAVENOUS at 14:11

## 2024-11-19 RX ADMIN — PROPOFOL 30 MG: 10 INJECTION, EMULSION INTRAVENOUS at 14:22

## 2024-11-19 NOTE — ANESTHESIA PREPROCEDURE EVALUATION
Procedure:  COLONOSCOPY    Relevant Problems   GI/HEPATIC   (+) Hematemesis      PULMONARY   (+) Asthma        Physical Exam    Airway    Mallampati score: I  TM Distance: >3 FB  Neck ROM: full     Dental   No notable dental hx     Cardiovascular      Pulmonary      Other Findings        Anesthesia Plan  ASA Score- 2     Anesthesia Type- IV sedation with anesthesia with ASA Monitors.         Additional Monitors:     Airway Plan:            Plan Factors-Exercise tolerance (METS): >4 METS.    Chart reviewed.    Patient summary reviewed.    Patient is not a current smoker.      There is medical exclusion for perioperative obstructive sleep apnea risk education.        Induction- intravenous.    Postoperative Plan-         Informed Consent- Anesthetic plan and risks discussed with patient.  I personally reviewed this patient with the CRNA. Discussed and agreed on the Anesthesia Plan with the CRNA..

## 2024-11-19 NOTE — ANESTHESIA POSTPROCEDURE EVALUATION
Post-Op Assessment Note    CV Status:  Stable  Pain Score: 0    Pain management: adequate       Mental Status:  Sleepy and arousable   Hydration Status:  Euvolemic and stable   PONV Controlled:  Controlled   Airway Patency:  Patent     Post Op Vitals Reviewed: Yes    No anethesia notable event occurred.    Staff: CRNA           Last Filed PACU Vitals:  Vitals Value Taken Time   Temp 97.2 °F (36.2 °C) 11/19/24 1427   Pulse 82 11/19/24 1427   /69 11/19/24 1427   Resp 20 11/19/24 1427   SpO2 95 % 11/19/24 1427       Modified Grupo:  Activity: 2 (11/19/2024  2:28 PM)  Respiration: 2 (11/19/2024  2:28 PM)  Circulation: 2 (11/19/2024  2:28 PM)  Consciousness: 1 (11/19/2024  2:28 PM)  Oxygen Saturation: 2 (11/19/2024  2:28 PM)  Modified Grpuo Score: 9 (11/19/2024  2:28 PM)

## 2024-11-25 ENCOUNTER — RESULTS FOLLOW-UP (OUTPATIENT)
Dept: GASTROENTEROLOGY | Facility: CLINIC | Age: 39
End: 2024-11-25

## 2024-11-25 PROCEDURE — 88305 TISSUE EXAM BY PATHOLOGIST: CPT | Performed by: SPECIALIST

## 2024-11-25 NOTE — RESULT ENCOUNTER NOTE
Please call the patient regarding his abnormal result.  Colon polyp was benign.  Biopsies in the rectum shows minimal inflammation.  There are nonspecific findings.  If symptoms persist call our office for evaluation.  Repeat colonoscopy otherwise in 10 years.  Follow up in my office as needed

## 2024-11-27 NOTE — TELEPHONE ENCOUNTER
"Pt. Called for pathology results, reviewed results with pt. With recommendations, pt. Verbalized understanding, but further review is  needed as he had questions about what the \"abnormal finding was\" if there was nothing found, please advise     "

## 2024-11-27 NOTE — RESULT ENCOUNTER NOTE
The biopsies from the rectum showed minimal inflammation which is a nonspecific finding.  The polyp was benign.  Everything else looked okay.  If any GI symptoms persist, follow-up with a GI provider.